# Patient Record
Sex: MALE | Race: WHITE | NOT HISPANIC OR LATINO | Employment: UNEMPLOYED | URBAN - METROPOLITAN AREA
[De-identification: names, ages, dates, MRNs, and addresses within clinical notes are randomized per-mention and may not be internally consistent; named-entity substitution may affect disease eponyms.]

---

## 2019-03-04 ENCOUNTER — OFFICE VISIT (OUTPATIENT)
Dept: FAMILY MEDICINE | Facility: CLINIC | Age: 59
End: 2019-03-04
Payer: COMMERCIAL

## 2019-03-04 ENCOUNTER — HOSPITAL ENCOUNTER (OUTPATIENT)
Dept: GENERAL RADIOLOGY | Facility: CLINIC | Age: 59
Discharge: HOME | End: 2019-03-04
Attending: FAMILY MEDICINE | Admitting: FAMILY MEDICINE
Payer: COMMERCIAL

## 2019-03-04 ENCOUNTER — TELEPHONE (OUTPATIENT)
Dept: FAMILY MEDICINE | Facility: CLINIC | Age: 59
End: 2019-03-04

## 2019-03-04 VITALS
TEMPERATURE: 97.8 F | SYSTOLIC BLOOD PRESSURE: 158 MMHG | HEART RATE: 102 BPM | OXYGEN SATURATION: 96 % | HEIGHT: 70 IN | BODY MASS INDEX: 25.51 KG/M2 | DIASTOLIC BLOOD PRESSURE: 94 MMHG | RESPIRATION RATE: 18 BRPM | WEIGHT: 178.2 LBS

## 2019-03-04 DIAGNOSIS — K42.9 UMBILICAL HERNIA WITHOUT OBSTRUCTION AND WITHOUT GANGRENE: ICD-10-CM

## 2019-03-04 DIAGNOSIS — M54.50 CHRONIC BILATERAL LOW BACK PAIN WITHOUT SCIATICA: ICD-10-CM

## 2019-03-04 DIAGNOSIS — H61.22 IMPACTED CERUMEN OF LEFT EAR: ICD-10-CM

## 2019-03-04 DIAGNOSIS — Z11.59 NEED FOR HEPATITIS C SCREENING TEST: ICD-10-CM

## 2019-03-04 DIAGNOSIS — I10 ESSENTIAL HYPERTENSION: ICD-10-CM

## 2019-03-04 DIAGNOSIS — Z11.3 SCREEN FOR STD (SEXUALLY TRANSMITTED DISEASE): ICD-10-CM

## 2019-03-04 DIAGNOSIS — E55.9 VITAMIN D DEFICIENCY: ICD-10-CM

## 2019-03-04 DIAGNOSIS — Z87.891 PERSONAL HISTORY OF TOBACCO USE: ICD-10-CM

## 2019-03-04 DIAGNOSIS — G89.29 CHRONIC BILATERAL LOW BACK PAIN WITHOUT SCIATICA: ICD-10-CM

## 2019-03-04 DIAGNOSIS — F10.21 ALCOHOL DEPENDENCE IN REMISSION (H): ICD-10-CM

## 2019-03-04 DIAGNOSIS — R97.20 ELEVATED PROSTATE SPECIFIC ANTIGEN (PSA): ICD-10-CM

## 2019-03-04 DIAGNOSIS — Z12.11 COLON CANCER SCREENING: ICD-10-CM

## 2019-03-04 DIAGNOSIS — Z00.00 ENCOUNTER FOR ROUTINE ADULT HEALTH EXAMINATION WITHOUT ABNORMAL FINDINGS: Primary | ICD-10-CM

## 2019-03-04 PROBLEM — Z86.79 HX OF ESSENTIAL HYPERTENSION: Status: ACTIVE | Noted: 2019-03-04

## 2019-03-04 PROBLEM — F10.20 ALCOHOL DEPENDENCE (H): Status: ACTIVE | Noted: 2019-03-04

## 2019-03-04 LAB
ALBUMIN SERPL-MCNC: 4 G/DL (ref 3.4–5)
ALP SERPL-CCNC: 91 U/L (ref 40–150)
ALT SERPL W P-5'-P-CCNC: 19 U/L (ref 0–70)
ANION GAP SERPL CALCULATED.3IONS-SCNC: 4 MMOL/L (ref 3–14)
AST SERPL W P-5'-P-CCNC: 12 U/L (ref 0–45)
BILIRUB SERPL-MCNC: 0.5 MG/DL (ref 0.2–1.3)
BUN SERPL-MCNC: 9 MG/DL (ref 7–30)
CALCIUM SERPL-MCNC: 8.9 MG/DL (ref 8.5–10.1)
CHLORIDE SERPL-SCNC: 110 MMOL/L (ref 94–109)
CHOLEST SERPL-MCNC: 198 MG/DL
CO2 SERPL-SCNC: 28 MMOL/L (ref 20–32)
CREAT SERPL-MCNC: 0.87 MG/DL (ref 0.66–1.25)
DEPRECATED CALCIDIOL+CALCIFEROL SERPL-MC: 10 UG/L (ref 20–75)
GFR SERPL CREATININE-BSD FRML MDRD: >90 ML/MIN/{1.73_M2}
GLUCOSE SERPL-MCNC: 100 MG/DL (ref 70–99)
HCV AB SERPL QL IA: NONREACTIVE
HDLC SERPL-MCNC: 48 MG/DL
HIV 1+2 AB+HIV1 P24 AG SERPL QL IA: NONREACTIVE
LDLC SERPL CALC-MCNC: 123 MG/DL
NONHDLC SERPL-MCNC: 150 MG/DL
POTASSIUM SERPL-SCNC: 4.5 MMOL/L (ref 3.4–5.3)
PROT SERPL-MCNC: 7.7 G/DL (ref 6.8–8.8)
PSA SERPL-ACNC: 4.43 UG/L (ref 0–4)
SODIUM SERPL-SCNC: 142 MMOL/L (ref 133–144)
TRIGL SERPL-MCNC: 134 MG/DL

## 2019-03-04 PROCEDURE — 99386 PREV VISIT NEW AGE 40-64: CPT | Performed by: FAMILY MEDICINE

## 2019-03-04 PROCEDURE — G0103 PSA SCREENING: HCPCS | Performed by: FAMILY MEDICINE

## 2019-03-04 PROCEDURE — 72100 X-RAY EXAM L-S SPINE 2/3 VWS: CPT | Mod: TC

## 2019-03-04 PROCEDURE — 82306 VITAMIN D 25 HYDROXY: CPT | Performed by: FAMILY MEDICINE

## 2019-03-04 PROCEDURE — 99214 OFFICE O/P EST MOD 30 MIN: CPT | Mod: 25 | Performed by: FAMILY MEDICINE

## 2019-03-04 PROCEDURE — 80061 LIPID PANEL: CPT | Performed by: FAMILY MEDICINE

## 2019-03-04 PROCEDURE — 80053 COMPREHEN METABOLIC PANEL: CPT | Performed by: FAMILY MEDICINE

## 2019-03-04 PROCEDURE — 36415 COLL VENOUS BLD VENIPUNCTURE: CPT | Performed by: FAMILY MEDICINE

## 2019-03-04 PROCEDURE — 69210 REMOVE IMPACTED EAR WAX UNI: CPT | Mod: LT | Performed by: FAMILY MEDICINE

## 2019-03-04 PROCEDURE — G0296 VISIT TO DETERM LDCT ELIG: HCPCS | Performed by: FAMILY MEDICINE

## 2019-03-04 PROCEDURE — G0472 HEP C SCREEN HIGH RISK/OTHER: HCPCS | Performed by: FAMILY MEDICINE

## 2019-03-04 PROCEDURE — 82947 ASSAY GLUCOSE BLOOD QUANT: CPT | Performed by: FAMILY MEDICINE

## 2019-03-04 PROCEDURE — 87389 HIV-1 AG W/HIV-1&-2 AB AG IA: CPT | Performed by: FAMILY MEDICINE

## 2019-03-04 RX ORDER — LISINOPRIL 10 MG/1
10 TABLET ORAL DAILY
Qty: 90 TABLET | Refills: 0 | Status: SHIPPED | OUTPATIENT
Start: 2019-03-04 | End: 2019-06-20

## 2019-03-04 ASSESSMENT — PAIN SCALES - GENERAL: PAINLEVEL: SEVERE PAIN (7)

## 2019-03-04 ASSESSMENT — MIFFLIN-ST. JEOR: SCORE: 1619.56
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
## 2019-03-04 NOTE — TELEPHONE ENCOUNTER
----- Message from Blaise Chance Mai, MD sent at 3/4/2019  3:30 PM CST -----  Please let patient know that his prostate enzyme level was slightly elevated, .  Will refer him to the urologist for further evaluation.  His kidney function test, liver function test and electrolytes were normal.  No diabetes.  His cholesterol looks good.  Recommend to work on healthy diet and exercise as discussed in the office today.  Thank you.

## 2019-03-04 NOTE — PATIENT INSTRUCTIONS
Preventive Health Recommendations  Male Ages 50 - 64    Yearly exam:             See your health care provider every year in order to  o   Review health changes.   o   Discuss preventive care.    o   Review your medicines if your doctor has prescribed any.     Have a cholesterol test every 5 years, or more frequently if you are at risk for high cholesterol/heart disease.     Have a diabetes test (fasting glucose) every three years. If you are at risk for diabetes, you should have this test more often.     Have a colonoscopy at age 50, or have a yearly FIT test (stool test). These exams will check for colon cancer.      Talk with your health care provider about whether or not a prostate cancer screening test (PSA) is right for you.    You should be tested each year for STDs (sexually transmitted diseases), if you re at risk.     Shots: Get a flu shot each year. Get a tetanus shot every 10 years.     Nutrition:    Eat at least 5 servings of fruits and vegetables daily.     Eat whole-grain bread, whole-wheat pasta and brown rice instead of white grains and rice.     Get adequate Calcium and Vitamin D.     Lifestyle    Exercise for at least 150 minutes a week (30 minutes a day, 5 days a week). This will help you control your weight and prevent disease.     Limit alcohol to one drink per day.     No smoking.     Wear sunscreen to prevent skin cancer.     See your dentist every six months for an exam and cleaning.     See your eye doctor every 1 to 2 years.    Lung Cancer Screening   Frequently Asked Questions  If you are at high-risk for lung cancer, getting screened with low-dose computed tomography (LDCT) every year can help save your life. This handout offers answers to some of the most common questions about lung cancer screening. If you have other questions, please call [phone] ([phone]).     What is it?  Lung cancer screening uses special X-ray technology to create an image of your lung tissue.  The exam is quick and easy and takes less than 10 seconds. We don t give you any medicine or use any needles. You can eat before and after the exam. You don t need to change your clothes as long as the clothing on your chest doesn t contain metal. But, you do need to be able to hold your breath for at least 6 seconds during the exam.    What is the goal of lung cancer screening?  The goal of lung cancer screening is to save lives. Many times, lung cancer is not found until a person starts having physical symptoms. Lung cancer screening can help detect lung cancer in the earliest stages when it may be easier to treat.    Who should be screened for lung cancer?  We suggest lung cancer screening for anyone who is at high-risk for lung cancer. You are in the high-risk group if you:      are between the ages of 55 and 79, and    have smoked at least 1 pack of cigarettes a day for 30 or more years, and    still smoke or have quit within the past 15 years.    However, if you have a new cough or shortness of breath, you should talk to your doctor before being screened.    Some national lung health advocacy groups also recommend screening for people ages 50 to 79 who have smoked an average of 1 pack of cigarettes a day for 20 years. They must also have at least 1 other risk factor for lung cancer, not including exposure to secondhand smoke. Other risk factors are having had cancer in the past, emphysema, pulmonary fibrosis, COPD, a family history of lung cancer, or exposure to certain materials such as arsenic, asbestos, beryllium, cadmium, chromium, diesel fumes, nickel, radon or silica. Your care team can help you know if you have one of these risk factors.     Why does it matter if I have symptoms?  Certain symptoms can be a sign that you have a condition in your lungs that should be checked and treated by your doctor. These symptoms include fever, chest pain, a new or changing cough, shortness of breath that you have  never felt before, coughing up blood or unexplained weight loss. Having any of these symptoms can greatly affect the results of lung cancer screening.       Should all smokers get an LDCT lung cancer screening exam?  It depends. Lung cancer screening is for a very specific group of men and women who have a history of heavy smoking over a long period of time (see  Who should be screened for lung cancer  above).  I am in the high-risk group, but have been diagnosed with cancer in the past. Is LDCT lung cancer screening right for me?  In some cases, you should not have LDCT lung screening, such as when your doctor is already following your cancer with CT scan studies. Your doctor will help you decide if LDCT lung screening is right for you.  Do I need to have a screening exam every year?  Yes. If you are in the high-risk group described earlier, you should get an LDCT lung cancer screening exam every year until you are 79, or are no longer willing or able to undergo screening and possible procedures to diagnose and treat lung cancer.  How effective is LDCT at preventing death from lung cancer?  Studies have shown that LDCT lung cancer screening can lower the risk of death from lung cancer by 20 percent in people who are at high-risk.  What are the risks?  There are some risks and limitations of LDCT lung cancer screening. We want to make sure you understand the risks and benefits, so please let us know if you have any questions. Your doctor may want to talk with you more about these risks.    Radiation exposure: As with any exam that uses radiation, there is a very small increased risk of cancer. The amount of radiation in LDCT is small--about the same amount a person would get from a mammogram. Your doctor orders the exam when he or she feels the potential benefits outweigh the risks.    False negatives: No test is perfect, including LDCT. It is possible that you may have a medical condition, including lung cancer,  that is not found during your exam. This is called a false negative result.    False positives and more testing: LDCT very often finds something in the lung that could be cancer, but in fact is not. This is called a false positive result. False positive tests often cause anxiety. To make sure these findings are not cancer, you may need to have more tests. These tests will be done only if you give us permission. Sometimes patients need a treatment that can have side effects, such as a biopsy. For more information on false positives, see  What can I expect from the results?     Findings not related to lung cancer: Your LDCT exam also takes pictures of areas of your body next to your lungs. In a very small number of cases, the CT scan will show an abnormal finding in one of these areas, such as your kidneys, adrenal glands, liver or thyroid. This finding may not be serious, but you may need more tests. Your doctor can help you decide what other tests you may need, if any.  What can I expect from the results?  About 1 out of 4 LDCT exams will find something that may need more tests. Most of the time, these findings are lung nodules. Lung nodules are very small collections of tissue in the lung. These nodules are very common, and the vast majority--more than 97 percent--are not cancer (benign). Most are normal lymph nodes or small areas of scarring from past infections.  But, if a small lung nodule is found to be cancer, the cancer can be cured more than 90 percent of the time. To know if the nodule is cancer, we may need to get more images before your next yearly screening exam. If the nodule has suspicious features (for example, it is large, has an odd shape or grows over time), we will refer you to a specialist for further testing.  Will my doctor also get the results?  Yes. Your doctor will get a copy of your results.  Is it okay to keep smoking now that there s a cancer screening exam?  No. Tobacco is one of the  strongest cancer-causing agents. It causes not only lung cancer, but other cancers and cardiovascular (heart) diseases as well. The damage caused by smoking builds over time. This means that the longer you smoke, the higher your risk of disease. While it is never too late to quit, the sooner you quit, the better.  Where can I find help to quit smoking?  The best way to prevent lung cancer is to stop smoking. If you have already quit smoking, congratulations and keep it up! For help on quitting smoking, please call [place] at 0-520-988-KGYB ([phone]) or the American [employer] Society at 1-948.511.5151 to find local resources near you.  One-on-one health coaching:  If you d prefer to work individually with a health care provider on tobacco cessation, we offer:      Medication Therapy Management:  Our specially trained pharmacists work closely with you and your doctor to help you quit smoking.  Call [phone] or [phone] (toll free).     Can Do: Health coaching offered by [facility].  [email or web address]

## 2019-03-04 NOTE — TELEPHONE ENCOUNTER
Left message to call back. Please relay the results to pt when calls back and route to Team if pt is in agreement with consult.

## 2019-03-05 DIAGNOSIS — E55.9 VITAMIN D DEFICIENCY: Primary | ICD-10-CM

## 2019-03-05 RX ORDER — ERGOCALCIFEROL 1.25 MG/1
50000 CAPSULE, LIQUID FILLED ORAL WEEKLY
Qty: 12 CAPSULE | Refills: 0 | Status: SHIPPED | OUTPATIENT
Start: 2019-03-05 | End: 2019-05-22

## 2019-03-05 NOTE — TELEPHONE ENCOUNTER
Referral for urology placed they will contact the patient with a date and time.   Tatyana RAMIREZ

## 2019-03-06 ENCOUNTER — TELEPHONE (OUTPATIENT)
Dept: FAMILY MEDICINE | Facility: CLINIC | Age: 59
End: 2019-03-06

## 2019-03-06 DIAGNOSIS — M54.9 BACK PAIN: Primary | ICD-10-CM

## 2019-03-06 NOTE — TELEPHONE ENCOUNTER
----- Message from Blaise Chance Mai, MD sent at 3/5/2019  5:43 PM CST -----  Please let patient know that his labs showed his vitamin D level was low and will call in the prescription for it.  Please take it as prescribed.  His screening test for hepatitis C and HIV were negative.

## 2019-03-06 NOTE — TELEPHONE ENCOUNTER
----- Message from Blaise Chance Mai, MD sent at 3/6/2019 11:27 AM CST -----  Please let patient know that his x-rays of the spine showed arthritis at multiple levels.  Recommend to consider physical therapy if the pain is bothersome.  Follow-up if he has any concerns or question.

## 2019-03-07 PROBLEM — E55.9 VITAMIN D DEFICIENCY: Status: ACTIVE | Noted: 2019-03-07

## 2019-03-07 PROBLEM — K42.9 UMBILICAL HERNIA WITHOUT OBSTRUCTION AND WITHOUT GANGRENE: Status: ACTIVE | Noted: 2019-03-07

## 2019-03-07 PROBLEM — R97.20 ELEVATED PROSTATE SPECIFIC ANTIGEN (PSA): Status: ACTIVE | Noted: 2019-03-07

## 2019-03-21 ENCOUNTER — TELEPHONE (OUTPATIENT)
Dept: FAMILY MEDICINE | Facility: CLINIC | Age: 59
End: 2019-03-21

## 2019-03-21 NOTE — TELEPHONE ENCOUNTER
Reason for Call:  Form, our goal is to have forms completed with 72 hours, however, some forms may require a visit or additional information.    Type of letter, form or note:  Request for medical opinion    Who is the form from?: Patient    Where did the form come from: Patient or family brought in       What clinic location was the form placed at?: [facility]    Where the form was placed: 's Box    What number is listed as a contact on the form?: number to contact is his nephew Maik [phone]       Additional comments: please fax form when it is complete     Call taken on 3/21/2019 at 12:01 PM by Tatyana Gould

## 2019-03-23 NOTE — TELEPHONE ENCOUNTER
I saw him once.  Not sure why he can't work.  Not able to give a fair and accurate workability opinion.  Follow up to further evaluate if he feels he is not able to work

## 2019-03-25 NOTE — TELEPHONE ENCOUNTER
Left message with male that answered the number given. He states he will let the patient know to call the clinic at [phone] to advise on message below.  Kasandra Berg, CMA

## 2019-03-26 NOTE — TELEPHONE ENCOUNTER
Patient has an appt with antoinette 4/5 and he will address this then.  Katharine Cosby MA 3/26/2019

## 2019-04-16 ENCOUNTER — HOSPITAL ENCOUNTER (OUTPATIENT)
Dept: GENERAL RADIOLOGY | Facility: CLINIC | Age: 59
Discharge: HOME | End: 2019-04-16
Attending: FAMILY MEDICINE | Admitting: FAMILY MEDICINE
Payer: COMMERCIAL

## 2019-04-16 ENCOUNTER — OFFICE VISIT (OUTPATIENT)
Dept: FAMILY MEDICINE | Facility: CLINIC | Age: 59
End: 2019-04-16
Payer: COMMERCIAL

## 2019-04-16 VITALS
TEMPERATURE: 97.8 F | WEIGHT: 177 LBS | HEART RATE: 100 BPM | SYSTOLIC BLOOD PRESSURE: 136 MMHG | DIASTOLIC BLOOD PRESSURE: 82 MMHG | OXYGEN SATURATION: 96 % | RESPIRATION RATE: 14 BRPM | BODY MASS INDEX: 25.4 KG/M2

## 2019-04-16 DIAGNOSIS — R97.20 ELEVATED PROSTATE SPECIFIC ANTIGEN (PSA): ICD-10-CM

## 2019-04-16 DIAGNOSIS — M54.10 BACK PAIN WITH RIGHT-SIDED RADICULOPATHY: ICD-10-CM

## 2019-04-16 DIAGNOSIS — Z12.11 COLON CANCER SCREENING: ICD-10-CM

## 2019-04-16 DIAGNOSIS — M25.551 HIP PAIN, RIGHT: ICD-10-CM

## 2019-04-16 DIAGNOSIS — I10 ESSENTIAL HYPERTENSION: Primary | ICD-10-CM

## 2019-04-16 PROCEDURE — 73502 X-RAY EXAM HIP UNI 2-3 VIEWS: CPT | Mod: TC

## 2019-04-16 PROCEDURE — 99214 OFFICE O/P EST MOD 30 MIN: CPT | Performed by: FAMILY MEDICINE

## 2019-04-16 RX ORDER — DICLOFENAC POTASSIUM 50 MG/1
50 TABLET, FILM COATED ORAL 3 TIMES DAILY PRN
Qty: 90 TABLET | Refills: 0 | Status: SHIPPED | OUTPATIENT
Start: 2019-04-16 | End: 2021-01-20

## 2019-04-16 ASSESSMENT — PAIN SCALES - GENERAL: PAINLEVEL: EXTREME PAIN (9)

## 2019-04-16 NOTE — PROGRESS NOTES
SUBJECTIVE:   Iftikhar DOVE is a 59 year old male who presents to clinic today for the following   health issues:    Hypertension Follow-up      Outpatient blood pressures are not being checked.    Low Salt Diet: no added salt      Amount of exercise or physical activity: 4-5 days/week for an average of 30-45 minutes    Problems taking medications regularly: No    Medication side effects: none    Diet: regular (no restrictions)    Iftikhar is here today for couple concerns.  First is to follow-up on the high blood pressure.  He has it for several years and has been controlled with lisinopril 10 mg daily.  No side effect.  Been taking medication as prescribed.  Not checking his blood pressure at home.  No headache or dizziness.  No chest pain or shortness of breath.  No leg swelling, orthopnea or dyspnea.  His blood pressure last month was normal.    Second concern is the back pain.  He has had for years and it has gotten worse slowly.  Tried for physical therapy in the past and with no effect.  In the lower back that radiates to the right leg.  It is a constant achy pain - becomes sharp with certain activities.  Also has a right hip pain.  No unusual activities or trauma.  Had back injury many years ago.  No fever or chills.  No problem with control her bowel movement or urination.  Is on his feet a lot and his work involves heavy lifting as he works as a .  Never had injection in the back before.  He was seen for this about a month ago and x-rays show arthritis.  He was recommended physical therapy has not started yet.    He was also found to have elevated PSA level recently.  No personal or family history of prostate cancer.  No nocturia but does have urine frequency at times.  No unintended weight loss.        PROBLEMS TO ADD ON...  FORMS questions    Additional history: as documented    Reviewed  and updated as needed this visit by clinical staff  Tobacco  Allergies  Meds  Soc Hx         Reviewed and updated as needed this visit by Provider         Current Outpatient Medications   Medication Sig Dispense Refill     diclofenac (CATAFLAM) 50 MG tablet Take 1 tablet (50 mg) by mouth 3 times daily as needed 90 tablet 0     lisinopril (PRINIVIL/ZESTRIL) 10 MG tablet Take 1 tablet (10 mg) by mouth daily 90 tablet 0     vitamin D2 (ERGOCALCIFEROL) 36447 units (1250 mcg) capsule Take 1 capsule (50,000 Units) by mouth once a week for 12 doses 12 capsule 0     Allergies   Allergen Reactions     Penicillins Unknown       ROS:  Constitutional, HEENT, cardiovascular, pulmonary, gi and gu systems are negative, except as otherwise noted.    OBJECTIVE:     /82 (BP Location: Right arm, Patient Position: Sitting, Cuff Size: Adult Large)   Pulse 100   Temp 97.8  F (36.6  C) (Temporal)   Resp 14   Wt 80.3 kg (177 lb)   SpO2 96%   BMI 25.40 kg/m    Body mass index is 25.4 kg/m .  GENERAL: healthy, alert and no distress  RESP: lungs clear to auscultation - no rales, rhonchi or wheezes  CV: regular rate and rhythm, no murmur  MS: no gross musculoskeletal defects noted, no edema.  Walk without limping.  Normal gait.  Both legs are equally in strength.  Ankle and knee exam were normal.  Right hip was slightly tender with palpation.  His range of motion was normal.  Pain with internal and external rotation the hip as well.  Back is straight, no lordosis or scoliosis.  Tender with palpation to the lower spine and its paraspinous muscle diffusely.  NEURO: Normal strength and tone, cranial nerves II through XII intact.  DTRs +2 throughout.    Diagnostic Test Results:  Results for orders placed or performed during the hospital encounter of 03/04/19   XR Lumbar Spine 2/3 Views    Narrative    LUMBAR SPINE TWO TO THREE VIEWS   3/4/2019 9:16 AM     HISTORY: Chronic back pain. Chronic bilateral low back pain without  sciatica.     COMPARISON: None.    FINDINGS: Alignment is significant for leftward deviation of the  lower  lumbar spine with respect to the thoracolumbar junction. Multilevel  mild disc height loss is present throughout. Moderate disc height loss  is present at L4-5 and L5-S1. Large osteophytes are present projecting  anteriorly on the right most conspicuous at L1-2, L2-3, and L3-4.  Multilevel facet arthropathy is present most severe at L4-5 and L5-S1.  No fractures are identified. Vertebral body heights are maintained.  Slight wedging of the L1, L2, and L3 vertebral bodies is likely  physiologic/degenerative. Paraspinal soft tissues are unremarkable.      Impression    IMPRESSION: Moderate degenerative change.    DENVER FIGUEROA MD       ASSESSMENT/PLAN:     1. Essential hypertension  Blood pressure is normal and stable.  Continue with the lisinopril.  Follow-up in 6 months.    2. Elevated prostate specific antigen (PSA)  PSA is slightly elevated.  Discussed with him about the nature of the finding.  Refer to urology for further evaluation.    3. Back pain with right-sided radiculopathy  Has had the pain for 20 years and is getting worse.  He works involved heavy lifting and prolonged standing through out the day as a .  X-ray a month ago showed arthritis.  Physical exam today was negative for focal neurological deficit.  Discussed about physical therapy versus MRI.  It is affecting his daily activities and his work.  Will refer to physical therapy.  Also will send for spine MRI.  In the meantime, will continue with his normal activities as tolerated.  Tylenol as needed for pain.  Also will have him try the diclofenac.    - diclofenac (CATAFLAM) 50 MG tablet; Take 1 tablet (50 mg) by mouth 3 times daily as needed  Dispense: 90 tablet; Refill: 0    4. Hip pain, right  X-rays show arthritis.  This certainly could be due to radiculopathy pain.  MRI of the spine as mentioned above.  Tylenol or diclofenac as needed for pain.  Also sent for physical therapy.    - diclofenac (CATAFLAM) 50 MG tablet;  Take 1 tablet (50 mg) by mouth 3 times daily as needed  Dispense: 90 tablet; Refill: 0    5. Colon cancer screening    - Fecal colorectal cancer screen (FIT); Future    Blaise Chance Mai, MD  [facility]

## 2019-04-16 NOTE — Clinical Note
1.  Please refer him to urology for elevated PSA2.  Please set up the lumbar spine MRI for low back pain - ordered3.  Please refer to physical therapy for low back pain and hip pain. - ordered

## 2019-04-18 ENCOUNTER — TELEPHONE (OUTPATIENT)
Dept: FAMILY MEDICINE | Facility: CLINIC | Age: 59
End: 2019-04-18

## 2019-04-18 NOTE — TELEPHONE ENCOUNTER
----- Message from Blaise Chance Mai, MD sent at 4/17/2019  6:09 PM CDT -----  Please let patient know that his hip x-ray showed mild arthritis.  If interested, I can refer him to orthopedic for further evaluation.  The other option is start physical therapy as discussed

## 2019-04-18 NOTE — LETTER
[address] [address]  [phone]        April 26, 2019    Iftikhar DOVE  [address]  [facility] [facility] [place] [address]          Dear Iftikhar,    Your hip x-ray showed mild arthritis.  If interested, Dr. Cox can refer you to an orthopedic for further evaluation.  The other option is to start physical therapy as discussed.  Orders have been placed for urology, PT, and MRI.  You can call radiology to make an MRI appointment and also for physical therapy appointment.  Please let us know if you have any further questions.    Sincerely,        Blaise Cox M.D. , jrm, cma

## 2019-04-19 NOTE — TELEPHONE ENCOUNTER
Tried to reach patient, left message for patient to call the clinic back.    Iva Roland, [facility]

## 2019-04-22 ENCOUNTER — TELEPHONE (OUTPATIENT)
Dept: FAMILY MEDICINE | Facility: CLINIC | Age: 59
End: 2019-04-22

## 2019-04-22 DIAGNOSIS — R97.20 ELEVATED PROSTATE SPECIFIC ANTIGEN (PSA): Primary | ICD-10-CM

## 2019-04-22 NOTE — TELEPHONE ENCOUNTER
When Ed calls back please let him know that Orders for Urology, PT and MRI have been placed.     Urology will be calling him to set up appointment.  Ed can be transferred to Radiology to make MRI appointment and also Physical Therapy.

## 2019-04-22 NOTE — TELEPHONE ENCOUNTER
Urology, MRI and PT are ordered.    Please let Ed know that Urology will be calling him to set up an appointment and he can be transferred to Radiology and/or PT to make the other appointments.    Please see other phone encounter as well.

## 2019-04-22 NOTE — TELEPHONE ENCOUNTER
Blaise Cox MD  [facility]             1.  Please refer him to urology for elevated PSA     2.  Please set up the lumbar spine MRI for low back pain - ordered     3.  Please refer to physical therapy for low back pain and hip pain. - ordered

## 2019-04-22 NOTE — TELEPHONE ENCOUNTER
Tried to reach patient, left message for patient to call the clinic back.    Iva Roland, [facility]

## 2019-04-23 NOTE — TELEPHONE ENCOUNTER
Tried to reach patient, left message for patient to call the clinic back.     Iva Roland, [facility]

## 2019-04-24 NOTE — TELEPHONE ENCOUNTER
Left message on mobile number asking for pt to call us back, it was some one else's name on the answering machine.  Tried home phone and who answered phone said there is no Edward there, that we have the wrong number.  Anthony Horton, [facility]

## 2019-04-26 NOTE — TELEPHONE ENCOUNTER
Mailed letter.  If patient calls back please inform him of the messages below.  Anthony Horton, CMA

## 2019-06-20 DIAGNOSIS — I10 ESSENTIAL HYPERTENSION: ICD-10-CM

## 2019-06-24 RX ORDER — LISINOPRIL 10 MG/1
TABLET ORAL
Qty: 90 TABLET | Refills: 2 | Status: SHIPPED | OUTPATIENT
Start: 2019-06-24 | End: 2021-02-24

## 2019-06-24 NOTE — TELEPHONE ENCOUNTER
"Requested Prescriptions   Pending Prescriptions Disp Refills     lisinopril (PRINIVIL/ZESTRIL) 10 MG tablet [Pharmacy Med Name: LISINOPRIL 10MG TABS] 90 tablet 0     Sig: TAKE ONE TABLET BY MOUTH ONCE DAILY   Last Written Prescription Date:  3/4/2019  Last Fill Quantity: 90,  # refills: 0   Last office visit: 4/16/2019 with prescribing provider:  Brooke   Future Office Visit:      ACE Inhibitors (Including Combos) Protocol Passed - 6/20/2019 12:22 PM        Passed - Blood pressure under 140/90 in past 12 months     BP Readings from Last 3 Encounters:   04/16/19 136/82   03/04/19 (!) 158/94   07/24/14 129/81           Passed - Recent (12 mo) or future (30 days) visit within the authorizing provider's specialty     Patient had office visit in the last 12 months or has a visit in the next 30 days with authorizing provider or within the authorizing provider's specialty.  See \"Patient Info\" tab in inbasket, or \"Choose Columns\" in Meds & Orders section of the refill encounter.            Passed - Medication is active on med list        Passed - Patient is age 18 or older        Passed - Normal serum creatinine on file in past 12 months     Recent Labs   Lab Test 03/04/19  0921   CR 0.87           Passed - Normal serum potassium on file in past 12 months     Recent Labs   Lab Test 03/04/19  0921   POTASSIUM 4.5           Prescription approved per [facility] Refill Protocol.  Thelma Portillo RN    "

## 2019-06-27 ENCOUNTER — TELEPHONE (OUTPATIENT)
Dept: FAMILY MEDICINE | Facility: CLINIC | Age: 59
End: 2019-06-27

## 2019-06-27 DIAGNOSIS — M25.551 HIP PAIN, RIGHT: Primary | ICD-10-CM

## 2019-06-27 NOTE — TELEPHONE ENCOUNTER
Patient tried to get the mri scheduled but they are . Can we reorder those? Patient did fall yesterday as his knees are now giving out. So they would like to get patient scheduled for the MRI.     Katharine Cosby MA 2019

## 2019-06-27 NOTE — TELEPHONE ENCOUNTER
Reason for Call: Request for an order or referral:    Order or referral being requested: Xray     Date needed: at your convenience    Has the patient been seen by the PCP for this problem?No But had seen Dr Cox     Additional comments: Patients niece calling stating she had tried to schedule from an order that was placed but radiology said it was . States patient is having issues with Knee and back but that is not what the order was for. Niece seems unsure of what was ordered but wants it scheduled as patient had fallen yesterday. Josias is [facility] authorized for scheduling and picking up items.   Please call and advise.     Phone number Patient can be reached at:  Other phone number:  [phone]    Best Time:       Can we leave a detailed message on this number?  Not Applicable    Call taken on 2019 at 12:00 PM by Gaby Gould

## 2019-06-28 NOTE — TELEPHONE ENCOUNTER
Spoke with Niece Josias, we are not erendira gto to the MRI of the spine. She is going to schedule physical therapy for his back and right hip and will also schedule with Orthopedics for right hip.  She scheduled him with Dr. Cox to discuss his knee issues.   Tatyana RAMIREZ

## 2019-07-08 NOTE — PROGRESS NOTES
Sports Medicine Clinic Visit    PCP: No Ref-Primary, Physician    CC: Patient presents with:  Right Hip - Pain      HPI:  Iftikhar DOVE is a 59 year old male who is seen in consultation at the request of Dr. Cox.   He notes right hip pain that has worsened over the last 2 years ago when he with no new injury.  States that he had a fall down the stairs in the 90's which started the pain.   He rates the pain at a  10/10 at its worst and a 9/10 currently.  Symptoms are relieved with nothing. Symptoms are worsened by cervical extension, prolonged standing, walking more than 1 mile. He endorses weakness and pain in other joints.   He denies numbness and tingling.  Other treatment has included physical therapy.  He notes difficulty with twisting.        Review of Systems:  Musculoskeletal: as above  Remainder of review of systems is negative including constitutional, eyes, ENT, CV, pulmonary, GI, , endocrine, skin, hematologic, and neurologic except as noted in HPI or medical history.    History reviewed. No pertinent past surgical/medical/family/social history other than as mentioned in HPI.    Patient Active Problem List   Diagnosis     Alcohol dependence (H)     Chronic bilateral low back pain without sciatica     Essential hypertension     Elevated prostate specific antigen (PSA)     Vitamin D deficiency     Umbilical hernia without obstruction and without gangrene     Past Medical History:   Diagnosis Date     Hypertension      Past Surgical History:   Procedure Laterality Date     NO HISTORY OF SURGERY       Family History   Problem Relation Age of Onset     Cancer Mother         colon -  75     Cancer Father         brain tumor     Unknown/Adopted Maternal Grandmother      Unknown/Adopted Maternal Grandfather      Unknown/Adopted Paternal Grandmother      Unknown/Adopted Paternal Grandfather      Unknown/Adopted Sister      No Known Problems Son      Unknown/Adopted Sister      Unknown/Adopted Sister       Unknown/Adopted Sister      No Known Problems Son      Social History     Socioeconomic History     Marital status: Single     Spouse name: Not on file     Number of children: Not on file     Years of education: Not on file     Highest education level: Not on file   Occupational History     Not on file   Social Needs     Financial resource strain: Not on file     Food insecurity:     Worry: Not on file     Inability: Not on file     Transportation needs:     Medical: Not on file     Non-medical: Not on file   Tobacco Use     Smoking status: Current Every Day Smoker     Packs/day: 1.00     Years: 50.00     Pack years: 50.00     Smokeless tobacco: Never Used   Substance and Sexual Activity     Alcohol use: Not Currently     Alcohol/week: 21.0 oz     Types: 42 Cans of beer per week     Comment: used to drink heavily - stopped since 1/19/2019     Drug use: Yes     Types: Marijuana     Comment: daily use     Sexual activity: Never     Partners: Female   Lifestyle     Physical activity:     Days per week: Not on file     Minutes per session: Not on file     Stress: Not on file   Relationships     Social connections:     Talks on phone: Not on file     Gets together: Not on file     Attends Bahai service: Not on file     Active member of club or organization: Not on file     Attends meetings of clubs or organizations: Not on file     Relationship status: Not on file     Intimate partner violence:     Fear of current or ex partner: Not on file     Emotionally abused: Not on file     Physically abused: Not on file     Forced sexual activity: Not on file   Other Topics Concern     Not on file   Social History Narrative     Not on file         Current Outpatient Medications   Medication     diclofenac (CATAFLAM) 50 MG tablet     lisinopril (PRINIVIL/ZESTRIL) 10 MG tablet     No current facility-administered medications for this visit.      Allergies   Allergen Reactions     Penicillins Unknown         Objective:  BP  (!) 160/97   Wt 77.6 kg (171 lb)   BMI 24.54 kg/m      General: Alert and in no distress    Head: Normocephalic, atraumatic  Eyes: no scleral icterus or conjunctival erythema   Oropharynx:  Mucous membranes moist  Skin: no erythema, petechiae, or jaundice  CV: regular rhythm by palpation, 2+ distal pulses  Resp: normal respiratory effort without conversational dyspnea   Psych: normal mood and affect    Gait: Non-antalgic, appropriate coordination and balance   Neuro: Motor strength and sensation as noted below    Musculoskeletal:    Hip and Low back exam:    Inspection:     no visible deformity in the low back       normal skin       normal vascular       normal lymphatic       no asymmetry    Palpation:  Tender over the lumbar spine    Lumbar ROM: Forward flexion is decreased and painful.  Lateral flexion is decreased.      Hip ROM:  Bilateral hip ROM is decreased, left more so than right.  Right low back pain with right hip ROM.      Strength:  5/5 hip flexion/abduction/adduction, knee flexion/extension, ankle dorsiflexion/plantarflexion, great toe extension, toe flexion    Sensation:    grossly intact throughout lower extremities    Radiology:  Independent visualization of images performed and reviewed with Ed.    PELVIS AND HIP RIGHT ONE VIEW   4/16/2019 2:50 PM      HISTORY: Hip pain, right.     COMPARISON: None.     FINDINGS:  Subtle lucencies in the right greater trochanter on the  frog-leg lateral view likely represent artifact. Subtle fractures in  this region are considered less likely. There are small osteophytes  around the right femoral head and right acetabulum as well as the left  femoral head and left acetabulum most consistent with mild  degenerative changes. Joint spaces are grossly well-maintained in the  bilateral hips. SI joints are unremarkable. No evidence for fracture  or malalignment. Overlying soft tissues are unremarkable.                                                                       IMPRESSION:  1. Mild degenerative changes of the bilateral hips.  2. Subtle lucency longitudinally projected over the greater trochanter  on the frog-leg lateral view likely represents artifact due to adipose  tissue in the leg. A very subtle fracture is considered less likely.  No other evidence for fracture.  3. No other abnormalities are identified.     HANSA ROYAL MD    LUMBAR SPINE TWO TO THREE VIEWS   3/4/2019 9:16 AM      HISTORY: Chronic back pain. Chronic bilateral low back pain without  sciatica.      COMPARISON: None.     FINDINGS: Alignment is significant for leftward deviation of the lower  lumbar spine with respect to the thoracolumbar junction. Multilevel  mild disc height loss is present throughout. Moderate disc height loss  is present at L4-5 and L5-S1. Large osteophytes are present projecting  anteriorly on the right most conspicuous at L1-2, L2-3, and L3-4.  Multilevel facet arthropathy is present most severe at L4-5 and L5-S1.  No fractures are identified. Vertebral body heights are maintained.  Slight wedging of the L1, L2, and L3 vertebral bodies is likely  physiologic/degenerative. Paraspinal soft tissues are unremarkable.                                                                      IMPRESSION: Moderate degenerative change.     DENVER FIGUEROA MD      Assessment:  1. Chronic bilateral low back pain without sciatica        Plan:  Discussed the assessment with the patient and developed a plan together:  -MRI of the lumbar spine ordered.  [facility] [phone]. Cost estimates can be provided by [facility] at [phone].    -Edward to follow up with primary care provider regarding elevated blood pressure.    -Following completion of MRI in clinic. Please schedule at least 1-2 business days after MRI is completed to ensure we have the results of the MRI.      Dalia Duran MD, [facility] Sports Medicine  [facility]

## 2019-07-09 ENCOUNTER — OFFICE VISIT (OUTPATIENT)
Dept: ORTHOPEDICS | Facility: CLINIC | Age: 59
End: 2019-07-09
Payer: COMMERCIAL

## 2019-07-09 VITALS — WEIGHT: 171 LBS | SYSTOLIC BLOOD PRESSURE: 160 MMHG | DIASTOLIC BLOOD PRESSURE: 97 MMHG | BODY MASS INDEX: 24.54 KG/M2

## 2019-07-09 DIAGNOSIS — M54.50 CHRONIC BILATERAL LOW BACK PAIN WITHOUT SCIATICA: Primary | ICD-10-CM

## 2019-07-09 DIAGNOSIS — G89.29 CHRONIC BILATERAL LOW BACK PAIN WITHOUT SCIATICA: Primary | ICD-10-CM

## 2019-07-09 PROCEDURE — 99244 OFF/OP CNSLTJ NEW/EST MOD 40: CPT | Performed by: PHYSICAL MEDICINE & REHABILITATION

## 2019-07-09 NOTE — LETTER
2019         RE: Iftikhar DOVE  [address]  [facility] [address]        Dear Colleague,    Thank you for referring your patient, Iftikhar DOVE, to the [facility]. Please see a copy of my visit note below.    Sports Medicine Clinic Visit    PCP: No Ref-Primary, Physician    CC: Patient presents with:  Right Hip - Pain      HPI:  Iftikhar DOVE is a 59 year old male who is seen in consultation at the request of Dr. Cox.   He notes right hip pain that has worsened over the last 2 years ago when he with no new injury.  States that he had a fall down the stairs in the 90's which started the pain.   He rates the pain at a  10/10 at its worst and a 9/10 currently.  Symptoms are relieved with nothing. Symptoms are worsened by cervical extension, prolonged standing, walking more than 1 mile. He endorses weakness and pain in other joints.   He denies numbness and tingling.  Other treatment has included physical therapy.  He notes difficulty with twisting.        Review of Systems:  Musculoskeletal: as above  Remainder of review of systems is negative including constitutional, eyes, ENT, CV, pulmonary, GI, , endocrine, skin, hematologic, and neurologic except as noted in HPI or medical history.    History reviewed. No pertinent past surgical/medical/family/social history other than as mentioned in HPI.    Patient Active Problem List   Diagnosis     Alcohol dependence (H)     Chronic bilateral low back pain without sciatica     Essential hypertension     Elevated prostate specific antigen (PSA)     Vitamin D deficiency     Umbilical hernia without obstruction and without gangrene     Past Medical History:   Diagnosis Date     Hypertension      Past Surgical History:   Procedure Laterality Date     NO HISTORY OF SURGERY       Family History   Problem Relation Age of Onset     Cancer Mother         colon -  75     Cancer Father         brain tumor     Unknown/Adopted Maternal  Grandmother      Unknown/Adopted Maternal Grandfather      Unknown/Adopted Paternal Grandmother      Unknown/Adopted Paternal Grandfather      Unknown/Adopted Sister      No Known Problems Son      Unknown/Adopted Sister      Unknown/Adopted Sister      Unknown/Adopted Sister      No Known Problems Son      Social History     Socioeconomic History     Marital status: Single     Spouse name: Not on file     Number of children: Not on file     Years of education: Not on file     Highest education level: Not on file   Occupational History     Not on file   Social Needs     Financial resource strain: Not on file     Food insecurity:     Worry: Not on file     Inability: Not on file     Transportation needs:     Medical: Not on file     Non-medical: Not on file   Tobacco Use     Smoking status: Current Every Day Smoker     Packs/day: 1.00     Years: 50.00     Pack years: 50.00     Smokeless tobacco: Never Used   Substance and Sexual Activity     Alcohol use: Not Currently     Alcohol/week: 21.0 oz     Types: 42 Cans of beer per week     Comment: used to drink heavily - stopped since 1/19/2019     Drug use: Yes     Types: Marijuana     Comment: daily use     Sexual activity: Never     Partners: Female   Lifestyle     Physical activity:     Days per week: Not on file     Minutes per session: Not on file     Stress: Not on file   Relationships     Social connections:     Talks on phone: Not on file     Gets together: Not on file     Attends Confucianist service: Not on file     Active member of club or organization: Not on file     Attends meetings of clubs or organizations: Not on file     Relationship status: Not on file     Intimate partner violence:     Fear of current or ex partner: Not on file     Emotionally abused: Not on file     Physically abused: Not on file     Forced sexual activity: Not on file   Other Topics Concern     Not on file   Social History Narrative     Not on file         Current Outpatient Medications    Medication     diclofenac (CATAFLAM) 50 MG tablet     lisinopril (PRINIVIL/ZESTRIL) 10 MG tablet     No current facility-administered medications for this visit.      Allergies   Allergen Reactions     Penicillins Unknown         Objective:  BP (!) 160/97   Wt 77.6 kg (171 lb)   BMI 24.54 kg/m       General: Alert and in no distress    Head: Normocephalic, atraumatic  Eyes: no scleral icterus or conjunctival erythema   Oropharynx:  Mucous membranes moist  Skin: no erythema, petechiae, or jaundice  CV: regular rhythm by palpation, 2+ distal pulses  Resp: normal respiratory effort without conversational dyspnea   Psych: normal mood and affect    Gait: Non-antalgic, appropriate coordination and balance   Neuro: Motor strength and sensation as noted below    Musculoskeletal:    Hip and Low back exam:    Inspection:     no visible deformity in the low back       normal skin       normal vascular       normal lymphatic       no asymmetry    Palpation:  Tender over the lumbar spine    Lumbar ROM: Forward flexion is decreased and painful.  Lateral flexion is decreased.      Hip ROM:  Bilateral hip ROM is decreased, left more so than right.  Right low back pain with right hip ROM.      Strength:  5/5 hip flexion/abduction/adduction, knee flexion/extension, ankle dorsiflexion/plantarflexion, great toe extension, toe flexion    Sensation:    grossly intact throughout lower extremities    Radiology:  Independent visualization of images performed and reviewed with Ed.    PELVIS AND HIP RIGHT ONE VIEW   4/16/2019 2:50 PM      HISTORY: Hip pain, right.     COMPARISON: None.     FINDINGS:  Subtle lucencies in the right greater trochanter on the  frog-leg lateral view likely represent artifact. Subtle fractures in  this region are considered less likely. There are small osteophytes  around the right femoral head and right acetabulum as well as the left  femoral head and left acetabulum most consistent with mild  degenerative  changes. Joint spaces are grossly well-maintained in the  bilateral hips. SI joints are unremarkable. No evidence for fracture  or malalignment. Overlying soft tissues are unremarkable.                                                                      IMPRESSION:  1. Mild degenerative changes of the bilateral hips.  2. Subtle lucency longitudinally projected over the greater trochanter  on the frog-leg lateral view likely represents artifact due to adipose  tissue in the leg. A very subtle fracture is considered less likely.  No other evidence for fracture.  3. No other abnormalities are identified.     HANSA ROYAL MD    LUMBAR SPINE TWO TO THREE VIEWS   3/4/2019 9:16 AM      HISTORY: Chronic back pain. Chronic bilateral low back pain without  sciatica.      COMPARISON: None.     FINDINGS: Alignment is significant for leftward deviation of the lower  lumbar spine with respect to the thoracolumbar junction. Multilevel  mild disc height loss is present throughout. Moderate disc height loss  is present at L4-5 and L5-S1. Large osteophytes are present projecting  anteriorly on the right most conspicuous at L1-2, L2-3, and L3-4.  Multilevel facet arthropathy is present most severe at L4-5 and L5-S1.  No fractures are identified. Vertebral body heights are maintained.  Slight wedging of the L1, L2, and L3 vertebral bodies is likely  physiologic/degenerative. Paraspinal soft tissues are unremarkable.                                                                      IMPRESSION: Moderate degenerative change.     DENVER FIGUEROA MD      Assessment:  1. Chronic bilateral low back pain without sciatica        Plan:  Discussed the assessment with the patient and developed a plan together:  -MRI of the lumbar spine ordered.  [facility] [phone]. Cost estimates can be provided by [facility] at [phone].    -Edward to follow up with primary care provider regarding elevated blood  pressure.    -Following completion of MRI in clinic. Please schedule at least 1-2 business days after MRI is completed to ensure we have the results of the MRI.      Dalia Duran MD, [facility] Sports Medicine  [facility]      Again, thank you for allowing me to participate in the care of your patient.        Sincerely,        Kelli Duran MD

## 2019-07-09 NOTE — PATIENT INSTRUCTIONS
-MRI of the lumbar spine ordered.  [facility] [phone]. Cost estimates can be provided by [facility] at [phone].    -Edward to follow up with Primary Care provider regarding elevated blood pressure.    -Following completion of MRI in clinic. Please schedule at least 1-2 business days after MRI is completed to ensure we have the results of the MRI.

## 2019-07-25 ENCOUNTER — HOSPITAL ENCOUNTER (OUTPATIENT)
Dept: MRI IMAGING | Facility: CLINIC | Age: 59
Discharge: HOME | End: 2019-07-25
Attending: PHYSICAL MEDICINE & REHABILITATION | Admitting: PHYSICAL MEDICINE & REHABILITATION
Payer: COMMERCIAL

## 2019-07-25 DIAGNOSIS — G89.29 CHRONIC BILATERAL LOW BACK PAIN WITHOUT SCIATICA: ICD-10-CM

## 2019-07-25 DIAGNOSIS — M54.50 CHRONIC BILATERAL LOW BACK PAIN WITHOUT SCIATICA: ICD-10-CM

## 2019-07-25 PROCEDURE — 72148 MRI LUMBAR SPINE W/O DYE: CPT

## 2019-08-05 ENCOUNTER — TELEPHONE (OUTPATIENT)
Dept: FAMILY MEDICINE | Facility: CLINIC | Age: 59
End: 2019-08-05

## 2019-08-05 NOTE — TELEPHONE ENCOUNTER
Panel Management Review      Patient has the following on his problem list:     Hypertension   Last three blood pressure readings:  BP Readings from Last 3 Encounters:   07/09/19 (!) 160/97   04/16/19 136/82   03/04/19 (!) 158/94     Blood pressure: FAILED    HTN Guidelines:  Less than 140/90      Composite cancer screening  Chart review shows that this patient is due/due soon for the following Fecal Colorectal (FIT)  Summary:    Patient is due/failing the following:   BP CHECK and FIT    Action needed:   Patient needs nurse only appointment.    Type of outreach:    Phone, spoke to patient.  Patient is on the nurse only schedule for a bp check due to being seein in the last 6 months.    Questions for provider review:    None                                                                                                                                    Rebecca Chavarria CMA      Chart routed to Care Team .

## 2019-08-06 ENCOUNTER — ALLIED HEALTH/NURSE VISIT (OUTPATIENT)
Dept: FAMILY MEDICINE | Facility: CLINIC | Age: 59
End: 2019-08-06

## 2019-08-06 VITALS — DIASTOLIC BLOOD PRESSURE: 86 MMHG | SYSTOLIC BLOOD PRESSURE: 138 MMHG

## 2019-08-06 DIAGNOSIS — I10 ESSENTIAL HYPERTENSION: Primary | ICD-10-CM

## 2019-08-06 PROCEDURE — 99207 ZZC NO CHARGE NURSE ONLY: CPT

## 2019-08-10 DIAGNOSIS — E55.9 VITAMIN D DEFICIENCY: ICD-10-CM

## 2019-08-12 NOTE — TELEPHONE ENCOUNTER
Requested Prescriptions   Pending Prescriptions Disp Refills     vitamin D2 (ERGOCALCIFEROL) 92948 units (1250 mcg) capsule [Pharmacy Med Name: VITAMIN D (ERGOCALCIFERO 50,000 CAPS] 12 capsule 0     Sig: TAKE 1 CAPSULE BY MOUTH ONCE A WEEK FOR 12 DOSES       There is no refill protocol information for this order        Last Written Prescription Date:  3/5/2019  Last Fill Quantity: 12,  # refills: 0   Last office visit: 4/16/2019 with prescribing provider:     Future Office Visit:      Routing refill request to provider for review/approval because:  Drug not on the [facility] refill protocol     Thelma Portillo RN

## 2019-08-13 RX ORDER — ERGOCALCIFEROL 1.25 MG/1
CAPSULE, LIQUID FILLED ORAL
Qty: 12 CAPSULE | Refills: 0 | OUTPATIENT
Start: 2019-08-13

## 2019-08-14 NOTE — TELEPHONE ENCOUNTER
I informed patient of the message below.  No further questions at this time.  Was looking for MRI results and I told him he needs to contact Dr. Duran and her team for the results.  Anthony Horton, CMA

## 2020-01-17 ENCOUNTER — TELEPHONE (OUTPATIENT)
Dept: SCHEDULING | Facility: CLINIC | Age: 60
End: 2020-01-17

## 2020-01-17 NOTE — TELEPHONE ENCOUNTER
1/17/2020    Call Regarding Preventive Health Screening Colonoscopy       Attempt 2    Message left with female    Comments:       Outreach   GB

## 2020-02-27 ENCOUNTER — TELEPHONE (OUTPATIENT)
Dept: FAMILY MEDICINE | Facility: CLINIC | Age: 60
End: 2020-02-27

## 2020-02-27 DIAGNOSIS — E55.9 VITAMIN D DEFICIENCY: ICD-10-CM

## 2020-02-27 RX ORDER — ERGOCALCIFEROL 1.25 MG/1
CAPSULE, LIQUID FILLED ORAL
Qty: 12 CAPSULE | Refills: 0 | OUTPATIENT
Start: 2020-02-27

## 2020-02-27 NOTE — TELEPHONE ENCOUNTER
Reason for Call:  Form, our goal is to have forms completed with 72 hours, however, some forms may require a visit or additional information.    Type of letter, form or note:  medical    Who is the form from?: Patient    Where did the form come from: Patient or family brought in       What clinic location was the form placed at?: [facility]    Where the form was placed: box Box/Folder    What number is listed as a contact on the form?: [phone]       Additional comments: thanks    Call taken on 2/27/2020 at 2:32 PM by Jayashree Teran

## 2020-02-27 NOTE — TELEPHONE ENCOUNTER
Requested Prescriptions   Pending Prescriptions Disp Refills     vitamin D2 (ERGOCALCIFEROL) 66566 units (1250 mcg) capsule [Pharmacy Med Name: VITAMIN D (ERGOCALCIFERO 50,000 CAPS] 12 capsule 0     Sig: TAKE 1 CAPSULE BY MOUTH ONCE A WEEK FOR 12 DOSES       There is no refill protocol information for this order        Last Written Prescription Date:  NA  Last Fill Quantity: NA,  # refills: NA   Last office visit: 4/6/2019 with prescribing provider:     Future Office Visit:   Next 5 appointments (look out 90 days)    Mar 17, 2020  2:20 PM CDT  Office Visit with Blaise Chance Mai, [facility]  [facility] [address] [place] [id number] [phone]         Routing refill request to provider for review/approval because:  Drug not on the [facility] refill protocol   Drug not active on patient's medication list    Thelma Portillo RN

## 2020-03-05 NOTE — TELEPHONE ENCOUNTER
Just received form on Monday 03/02/2020-Dr. Cox is out of the clinic until Monday 03/09/2020   Tatyana RAMIREZ

## 2020-03-10 NOTE — TELEPHONE ENCOUNTER
Was already scheduled for appointment on the 17th for a long visit- changed to a physical.   Chichi Lamar CMA

## 2020-03-10 NOTE — TELEPHONE ENCOUNTER
Not able to fill without the medical opinion form on his behalf.  His last clinic visit was in April 2019.  Please follow-up for it - recommend to follow-up as a physical exam

## 2021-01-20 ENCOUNTER — OFFICE VISIT (OUTPATIENT)
Dept: FAMILY MEDICINE | Facility: CLINIC | Age: 61
End: 2021-01-20
Payer: COMMERCIAL

## 2021-01-20 VITALS
RESPIRATION RATE: 12 BRPM | OXYGEN SATURATION: 99 % | HEART RATE: 80 BPM | WEIGHT: 171 LBS | SYSTOLIC BLOOD PRESSURE: 180 MMHG | DIASTOLIC BLOOD PRESSURE: 100 MMHG | TEMPERATURE: 99 F | BODY MASS INDEX: 25.33 KG/M2 | HEIGHT: 69 IN

## 2021-01-20 DIAGNOSIS — G89.29 CHRONIC BILATERAL LOW BACK PAIN WITHOUT SCIATICA: ICD-10-CM

## 2021-01-20 DIAGNOSIS — I10 ESSENTIAL HYPERTENSION: ICD-10-CM

## 2021-01-20 DIAGNOSIS — Z00.00 ROUTINE GENERAL MEDICAL EXAMINATION AT A HEALTH CARE FACILITY: Primary | ICD-10-CM

## 2021-01-20 DIAGNOSIS — R97.20 ELEVATED PROSTATE SPECIFIC ANTIGEN (PSA): ICD-10-CM

## 2021-01-20 DIAGNOSIS — F17.200 TOBACCO USE DISORDER: ICD-10-CM

## 2021-01-20 DIAGNOSIS — M54.50 CHRONIC BILATERAL LOW BACK PAIN WITHOUT SCIATICA: ICD-10-CM

## 2021-01-20 DIAGNOSIS — F10.21 ALCOHOL DEPENDENCE IN REMISSION (H): ICD-10-CM

## 2021-01-20 DIAGNOSIS — E55.9 VITAMIN D DEFICIENCY: ICD-10-CM

## 2021-01-20 LAB
ALBUMIN SERPL-MCNC: 4.1 G/DL (ref 3.4–5)
ALP SERPL-CCNC: 86 U/L (ref 40–150)
ALT SERPL W P-5'-P-CCNC: 16 U/L (ref 0–70)
ANION GAP SERPL CALCULATED.3IONS-SCNC: 3 MMOL/L (ref 3–14)
AST SERPL W P-5'-P-CCNC: 9 U/L (ref 0–45)
BILIRUB SERPL-MCNC: 0.6 MG/DL (ref 0.2–1.3)
BUN SERPL-MCNC: 15 MG/DL (ref 7–30)
CALCIUM SERPL-MCNC: 9.2 MG/DL (ref 8.5–10.1)
CHLORIDE SERPL-SCNC: 107 MMOL/L (ref 94–109)
CHOLEST SERPL-MCNC: 202 MG/DL
CO2 SERPL-SCNC: 30 MMOL/L (ref 20–32)
CREAT SERPL-MCNC: 0.8 MG/DL (ref 0.66–1.25)
ERYTHROCYTE [DISTWIDTH] IN BLOOD BY AUTOMATED COUNT: 12.2 % (ref 10–15)
GFR SERPL CREATININE-BSD FRML MDRD: >90 ML/MIN/{1.73_M2}
GLUCOSE SERPL-MCNC: 95 MG/DL (ref 70–99)
HCT VFR BLD AUTO: 45.5 % (ref 40–53)
HDLC SERPL-MCNC: 54 MG/DL
HGB BLD-MCNC: 15.3 G/DL (ref 13.3–17.7)
LDLC SERPL CALC-MCNC: 126 MG/DL
MCH RBC QN AUTO: 30.1 PG (ref 26.5–33)
MCHC RBC AUTO-ENTMCNC: 33.6 G/DL (ref 31.5–36.5)
MCV RBC AUTO: 90 FL (ref 78–100)
NONHDLC SERPL-MCNC: 148 MG/DL
PLATELET # BLD AUTO: 377 10E9/L (ref 150–450)
POTASSIUM SERPL-SCNC: 4 MMOL/L (ref 3.4–5.3)
PROT SERPL-MCNC: 7.6 G/DL (ref 6.8–8.8)
PSA SERPL-ACNC: 4.45 UG/L (ref 0–4)
RBC # BLD AUTO: 5.08 10E12/L (ref 4.4–5.9)
SODIUM SERPL-SCNC: 140 MMOL/L (ref 133–144)
TRIGL SERPL-MCNC: 111 MG/DL
TSH SERPL DL<=0.005 MIU/L-ACNC: 1.4 MU/L (ref 0.4–4)
WBC # BLD AUTO: 7.9 10E9/L (ref 4–11)

## 2021-01-20 PROCEDURE — G0103 PSA SCREENING: HCPCS | Performed by: FAMILY MEDICINE

## 2021-01-20 PROCEDURE — 80061 LIPID PANEL: CPT | Performed by: FAMILY MEDICINE

## 2021-01-20 PROCEDURE — 99396 PREV VISIT EST AGE 40-64: CPT | Performed by: FAMILY MEDICINE

## 2021-01-20 PROCEDURE — 99213 OFFICE O/P EST LOW 20 MIN: CPT | Mod: 25 | Performed by: FAMILY MEDICINE

## 2021-01-20 PROCEDURE — 85027 COMPLETE CBC AUTOMATED: CPT | Performed by: FAMILY MEDICINE

## 2021-01-20 PROCEDURE — 80053 COMPREHEN METABOLIC PANEL: CPT | Performed by: FAMILY MEDICINE

## 2021-01-20 PROCEDURE — 82306 VITAMIN D 25 HYDROXY: CPT | Performed by: FAMILY MEDICINE

## 2021-01-20 PROCEDURE — 36415 COLL VENOUS BLD VENIPUNCTURE: CPT | Performed by: FAMILY MEDICINE

## 2021-01-20 PROCEDURE — 84443 ASSAY THYROID STIM HORMONE: CPT | Performed by: FAMILY MEDICINE

## 2021-01-20 RX ORDER — TAMSULOSIN HYDROCHLORIDE 0.4 MG/1
0.4 CAPSULE ORAL AT BEDTIME
Qty: 30 CAPSULE | Refills: 0 | Status: SHIPPED | OUTPATIENT
Start: 2021-01-20 | End: 2021-02-24

## 2021-01-20 RX ORDER — METHOCARBAMOL 500 MG/1
TABLET, FILM COATED ORAL
Qty: 60 TABLET | Refills: 0 | Status: SHIPPED | OUTPATIENT
Start: 2021-01-20 | End: 2023-10-06

## 2021-01-20 RX ORDER — ATENOLOL AND CHLORTHALIDONE TABLET 50; 25 MG/1; MG/1
0.5 TABLET ORAL DAILY
Qty: 30 TABLET | Refills: 0 | Status: SHIPPED | OUTPATIENT
Start: 2021-01-20 | End: 2021-02-24

## 2021-01-20 RX ORDER — CELECOXIB 200 MG/1
200 CAPSULE ORAL DAILY
Qty: 30 CAPSULE | Refills: 0 | Status: SHIPPED | OUTPATIENT
Start: 2021-01-20 | End: 2023-10-06

## 2021-01-20 ASSESSMENT — MIFFLIN-ST. JEOR: SCORE: 1567.38

## 2021-01-20 ASSESSMENT — PAIN SCALES - GENERAL: PAINLEVEL: EXTREME PAIN (8)

## 2021-01-20 NOTE — PROGRESS NOTES
SUBJECTIVE:   CC: Iftikhar DOVE is an 61 year old male who presents for preventative health visit.       Patient has been advised of split billing requirements and indicates understanding: Yes  Healthy Habits:    Getting at least 3 servings of Calcium per day:  NO    Bi-annual eye exam:  NO    Dental care twice a year:  NO    Sleep apnea or symptoms of sleep apnea:  None    Diet:  Regular (no restrictions) and Low salt    Frequency of exercise:  None    Taking medications regularly:  Yes    Barriers to taking medications:  None    Medication side effects:  None    PHQ-2 Total Score:    Additional concerns today:  Yes (see note )        Patient want's results from MRI  7/25/2019 and is asking for paperwork related to that but does ti have it with him today- [place] the county.        Iftikhar is here today for his general physical with a couple of concerns.    1.  Follow-up on high blood pressure.  Was on lisinopril but stopped taking it for a while.  Not checking his blood pressure at home.  No headache or dizziness.  No chest pain or shortness of breath.  No leg swelling, orthopnea or dyspnea.  Denies of excessive salt intake.  Not exercising.    2.  Follow-up on his chronic low back pain for which he has had for years.  Constant achy pain, starts in the low back that radiates to the left hip.  Has gotten worse slowly and is worse with twisting his back or weight bearing activities.  Saw the sport medicine last year and had an MRI done but has not heard about the result.  I would like to discuss about the MRI result.  Also would like to to have his pain addressed.  Diclofenac has not helped.  The pain is bothering him.  The same kind of pain that he has had.  Back feel stiff, especially in the morning.  No numbness or tingling sensation.  No weakness.  No fever or chills.  No recent history of trauma or unusual activities.    3.  He has a history of alcoholism but has not been drinking  heavily for couple years - drinks rarely and socially.  He has no concern about it.  No craving.     4. Otherwise, he is doing ok. No major medical care or procedure done since the last physical over a year ago. No headache or dizziness.  No acute visual change. No running nose, nasal congestion, coughing, fever or chill. No CP/SOB. No N/V/D/C. Has a hard time to initiate urination with urgency at times. Get up to 3 times a night for urination.  Known to have elevated PSA.  No abdominal pain.  Denied of STD risks.  No leg swelling.  Not exercising.  Denied of drug use.  Smoking about 1/2 ppd, not ready to quit. No problem with sleeping.  Feels safe at home - lives with nephew and his family.  No weight change and denied of being depressed. No other concern today      Today's PHQ-2 Score:   PHQ-2 ( 1999 Pf[employer]) 3/4/2019   Q1: Little interest or pleasure in doing things 0   Q2: Feeling down, depressed or hopeless 0   PHQ-2 Score 0       Abuse: Current or Past(Physical, Sexual or Emotional)- No  Do you feel safe in your environment? Yes    Have you ever done Advance Care Planning? (For example, a Health Directive, POLST, or a discussion with a medical provider or your loved ones about your wishes): No, advance care planning information given to patient to review.  Patient declined advance care planning discussion at this time.    Social History     Tobacco Use     Smoking status: Current Every Day Smoker     Packs/day: 1.00     Years: 50.00     Pack years: 50.00     Smokeless tobacco: Never Used   Substance Use Topics     Alcohol use: Not Currently     Alcohol/week: 35.0 standard drinks     Types: 42 Cans of beer per week     Comment: used to drink heavily - stopped since 1/19/2019     If you drink alcohol do you typically have >3 drinks per day or >7 drinks per week? No    No flowsheet data found.    Last PSA:   PSA   Date Value Ref Range Status   03/04/2019 4.43 (H) 0 - 4 ug/L Final     Comment:     Assay Method:   Chemiluminescence using Siemens Vista analyzer       Reviewed orders with patient. Reviewed health maintenance and updated orders accordingly - Yes  BP Readings from Last 3 Encounters:   21 (!) 180/100   19 138/86   19 (!) 160/97    Wt Readings from Last 3 Encounters:   21 77.6 kg (171 lb)   19 77.6 kg (171 lb)   19 80.3 kg (177 lb)                  Patient Active Problem List   Diagnosis     Alcohol dependence (H)     Chronic bilateral low back pain without sciatica     Essential hypertension     Elevated prostate specific antigen (PSA)     Vitamin D deficiency     Umbilical hernia without obstruction and without gangrene     Past Surgical History:   Procedure Laterality Date     NO HISTORY OF SURGERY         Social History     Tobacco Use     Smoking status: Current Every Day Smoker     Packs/day: 0.50     Years: 50.00     Pack years: 25.00     Smokeless tobacco: Never Used   Substance Use Topics     Alcohol use: Yes     Alcohol/week: 35.0 standard drinks     Types: 42 Cans of beer per week     Comment: used  heavily - rarely since 2019     Family History   Problem Relation Age of Onset     Cancer Mother         colon -  75     Cancer Father         brain tumor     Unknown/Adopted Maternal Grandmother      Unknown/Adopted Maternal Grandfather      Unknown/Adopted Paternal Grandmother      Unknown/Adopted Paternal Grandfather      Unknown/Adopted Sister      No Known Problems Son      Unknown/Adopted Sister      Unknown/Adopted Sister      Unknown/Adopted Sister      No Known Problems Son          Current Outpatient Medications   Medication Sig Dispense Refill     atenolol-chlorthalidone (TENORETIC) 50-25 MG tablet Take 0.5 tablets by mouth daily 30 tablet 0     celecoxib (CELEBREX) 200 MG capsule Take 1 capsule (200 mg) by mouth daily 30 capsule 0     lisinopril (PRINIVIL/ZESTRIL) 10 MG tablet TAKE ONE TABLET BY MOUTH ONCE DAILY 90 tablet 2     methocarbamol (ROBAXIN)  "500 MG tablet Take 1 tablet in am and 1 at bedtime 60 tablet 0     tamsulosin (FLOMAX) 0.4 MG capsule Take 1 capsule (0.4 mg) by mouth At Bedtime 30 capsule 0     Allergies   Allergen Reactions     Penicillins Unknown       Reviewed and updated as needed this visit by clinical staff                 Reviewed and updated as needed this visit by Provider                Past Medical History:   Diagnosis Date     Hypertension       Past Surgical History:   Procedure Laterality Date     NO HISTORY OF SURGERY         Review of Systems  Please see subjective otherwise the complete review system was negative.    OBJECTIVE:   BP (!) 180/100   Pulse 80   Temp 99  F (37.2  C)   Resp 12   Ht 1.763 m (5' 9.4\")   Wt 77.6 kg (171 lb)   SpO2 99%   BMI 24.96 kg/m      Physical Exam   GENERAL: alert and no distress, speaking full sentences  EYES: Eyes grossly normal to inspection, PERRL and conjunctivae and sclerae normal.  No nystagmus.  All 4 visual fields are intact  HENT: Ear canals and TM's normal.  Nares are non-congested. Oropharynx is pink and moist. No tender with palpation to the sinuses.  NECK: no adenopathy, supple, no lymphadenopathy or thyromegaly.  No tender with palpation to the cervical spine or its paraspinous muscle.  RESP: lungs clear to auscultation - no rales, rhonchi or wheezes  CV: regular rate and rhythm, no murmur.  ABDOMEN: soft, nondistended, nontender, no palpable masses or organomegaly with normal bowel sounds.  MS: no gross musculoskeletal defects noted, no edema.  Walk with no limping, somewhat unstable gait with positional change from sitting to standing.  All 4 extremities are equally in strength. Ankle, knees, hips, shoulders, elbows and wrists exams normal.  Negative straight leg maneuver.  Back is straight, no lordosis or scoliosis and is tender with palpation to the lumbar spine.  SKIN: no suspicious lesions or rashes  NEURO: Normal strength and tone, mentation intact and speech normal.  " Cranial nerves II through XII intact, DTR +2 throughout, no focal neurological deficit.  PSYCH: mentation appears normal, affect normal/bright.  Thoughts intact, no hallucination.  No suicidal or homicidal ideation.  LYMPH: no cervical, supraclavicular or axillary adenopathy.   (male): Offered and recommended but he declined.      Diagnostic Test Results:  Labs reviewed in Epic  Results for orders placed or performed in visit on 01/20/21   Comprehensive metabolic panel     Status: None   Result Value Ref Range    Sodium 140 133 - 144 mmol/L    Potassium 4.0 3.4 - 5.3 mmol/L    Chloride 107 94 - 109 mmol/L    Carbon Dioxide 30 20 - 32 mmol/L    Anion Gap 3 3 - 14 mmol/L    Glucose 95 70 - 99 mg/dL    Urea Nitrogen 15 7 - 30 mg/dL    Creatinine 0.80 0.66 - 1.25 mg/dL    GFR Estimate >90 >60 mL/min/[1.73_m2]    GFR Estimate If Black >90 >60 mL/min/[1.73_m2]    Calcium 9.2 8.5 - 10.1 mg/dL    Bilirubin Total 0.6 0.2 - 1.3 mg/dL    Albumin 4.1 3.4 - 5.0 g/dL    Protein Total 7.6 6.8 - 8.8 g/dL    Alkaline Phosphatase 86 40 - 150 U/L    ALT 16 0 - 70 U/L    AST 9 0 - 45 U/L   Lipid panel reflex to direct LDL Fasting     Status: Abnormal   Result Value Ref Range    Cholesterol 202 (H) <200 mg/dL    Triglycerides 111 <150 mg/dL    HDL Cholesterol 54 >39 mg/dL    LDL Cholesterol Calculated 126 (H) <100 mg/dL    Non HDL Cholesterol 148 (H) <130 mg/dL   PROSTATE SPEC ANTIGEN SCREEN     Status: Abnormal   Result Value Ref Range    PSA 4.45 (H) 0 - 4 ug/L   TSH with free T4 reflex     Status: None   Result Value Ref Range    TSH 1.40 0.40 - 4.00 mU/L   Vitamin D Deficiency     Status: Abnormal   Result Value Ref Range    Vitamin D Deficiency screening 17 (L) 20 - 75 ug/L   CBC with platelets     Status: None   Result Value Ref Range    WBC 7.9 4.0 - 11.0 10e9/L    RBC Count 5.08 4.4 - 5.9 10e12/L    Hemoglobin 15.3 13.3 - 17.7 g/dL    Hematocrit 45.5 40.0 - 53.0 %    MCV 90 78 - 100 fl    MCH 30.1 26.5 - 33.0 pg    MCHC 33.6  31.5 - 36.5 g/dL    RDW 12.2 10.0 - 15.0 %    Platelet Count 377 150 - 450 10e9/L       ASSESSMENT/PLAN:   1. Routine general medical examination at a health care facility  Iftikhar has several chronic medical condition that need to be better managed.  He has not been taking medications as prescribed.  Please see below for further details and he was strongly recommended the medication prescribed.  Recommend Pneumovax, shingle and flu vaccination but he declined.  Discussed about safety issue, healthy diet, exercising, fall prevention, alcohol/drugs misuse prevention, depression prevention and good sleeping hygiene.  Recommended daily exercise for at least 30 minutes or as tolerated.  Emphasized on healthy diet with adequate fluid intake and resting. Feels safe at home.  Follow in 1 year, earlier as needed.  Labs as ordered below.      Discussed with him about options for colon cancer screening which include colonoscopy, Cologuard or FIT test.  Pros and cons of each option discussed.  He preferred FIT test.  He understands that positive FIT test will require further evaluation, including colonoscopy.  He was also informed that FIT is recommended to be done annually.       Discussed with him about the pros and cons of prostate screening cancer with PSA.  Also educated about the potential false positive which may require unnecessary work-up.  He was informed of negative/normal PSA leve does not rule out prostate cancer completely although it is very unlikely.  He understands and is willing to take the risk.     All of his questions were answered.      - Comprehensive metabolic panel  - Lipid panel reflex to direct LDL Fasting  - PROSTATE SPEC ANTIGEN SCREEN  - TSH with free T4 reflex  - Vitamin D Deficiency  - CBC with platelets  - Fecal colorectal cancer screen (FIT); Future    2. Essential hypertension  Not controlled and his BP is high today.  Been off the medication for awhile.  Discussed with him about the nature of  the condition and emphasized the importance of having it under controlled. Educated him about the long and short term consequences of uncontrolled HTN as well as the goal for his blood pressure.  Started him on Tenoretic.  Encouraged daily exercise, healthy/low-salt diet and avoid high caffeine intake. Lab ordered today CMP and CBC.  Return in a week to 10 days for blood pressure for blood pressure check with the nursing staff.    - atenolol-chlorthalidone (TENORETIC) 50-25 MG tablet; Take 0.5 tablets by mouth daily  Dispense: 30 tablet; Refill: 0    3. Elevated prostate specific antigen (PSA)  His prostate enzyme was elevated last year and again this year - about the same.  He also displayed symptoms of BPH.  I discussed with him about the significance of the finding and informed him that it is most likely due to enlarged prostate.  However, prostate cancer need to be ruled out.  Referred to urology for further evaluation and management.  He agreed with the plan    - PROSTATE SPEC ANTIGEN SCREEN  - tamsulosin (FLOMAX) 0.4 MG capsule; Take 1 capsule (0.4 mg) by mouth At Bedtime  Dispense: 30 capsule; Refill: 0    4. Chronic bilateral low back pain without sciatica  Reviewed the 2019 lumbar spine MRI result with patient which indicated of multilevel degenerative changes of the lumbar spine with the most pronounced finding at L4-L5 and L5-S1 with bulging disc and annular fissure; severe right and moderate to severe left facet arthropathy as well as mild to moderate left neuroforaminal stenosis.  Discussed with him about the significance of the finding.  Exam today showed no focal neurological deficit.  Clinical presentation did not suggest equina syndrome or infection.  Will refer him to physical therapy.  Also started him on Celebrex for pain and Robaxin for muscle spasm.  Side effect discussed.    - methocarbamol (ROBAXIN) 500 MG tablet; Take 1 tablet in am and 1 at bedtime  Dispense: 60 tablet; Refill: 0  -  "PHYSICAL THERAPY REFERRAL; Future  - celecoxib (CELEBREX) 200 MG capsule; Take 1 capsule (200 mg) by mouth daily  Dispense: 30 capsule; Refill: 0    5. Alcohol dependence in remission (H)  He has a long history of alcoholism but has been in remission in the last couple years.  He now drinks rarely.  Encouraged him to keep the good work with sobering.    6. Vitamin D deficiency  Food with high vitamin D sources discussed and recommended.  Started him on the vitamin D supplement.    - Vitamin D Deficiency    7.  Tobacco use disorder  Iftikhar has been smoking for years.  Discussed with him about the long and short term consequences of tobacco smoking.  Discussed with him about different options for smoking cessation aids.  He is not ready to quit smoking at this time.  Encourage to let me know when he is ready to quit.  In a mean time, I encourage him to reduce to amount of cigarrets smoke as much as possible.  All of his questions were answered.     Patient has been advised of split billing requirements and indicates understanding: No  COUNSELING:   Reviewed preventive health counseling, as reflected in patient instructions       Regular exercise       Healthy diet/nutrition       Vision screening       Hearing screening       Immunizations    Declined: Influenza, Pneumococcal and Zoster due to Conscientious objector               Aspirin prophylaxis        Alcohol Use        Consider Hep C screening for all patients one time for ages 18-79 years       Colon cancer screening       Prostate cancer screening       Osteoporosis prevention/bone health       Consider lung cancer screening for ages 55-80 years and 30 pack-year smoking history         One time pneumovax for smokers       Advance Care Planning    Estimated body mass index is 24.54 kg/m  as calculated from the following:    Height as of 3/4/19: 1.778 m (5' 10\").    Weight as of 7/9/19: 77.6 kg (171 lb).         He reports that he has been smoking. He has a " 50.00 pack-year smoking history. He has never used smokeless tobacco.  Tobacco Cessation Action Plan:   Information offered: Patient not interested at this time      Counseling Resources:  ATP IV Guidelines  Pooled Cohorts Equation Calculator  FRAX Risk Assessment  ICSI Preventive Guidelines  Dietary Guidelines for Americans, 2010  USDA's MyPlate  ASA Prophylaxis  Lung CA Screening    Blaise Chance Mai, MD  [facility]

## 2021-01-21 LAB — DEPRECATED CALCIDIOL+CALCIFEROL SERPL-MC: 17 UG/L (ref 20–75)

## 2021-01-22 ENCOUNTER — TELEPHONE (OUTPATIENT)
Dept: FAMILY MEDICINE | Facility: CLINIC | Age: 61
End: 2021-01-22

## 2021-01-22 PROBLEM — F17.200 TOBACCO USE DISORDER: Status: ACTIVE | Noted: 2021-01-22

## 2021-01-22 PROBLEM — F10.20 ALCOHOL DEPENDENCE (H): Status: RESOLVED | Noted: 2019-03-04 | Resolved: 2021-01-22

## 2021-01-22 PROBLEM — F10.21 ALCOHOL DEPENDENCE IN REMISSION (H): Status: ACTIVE | Noted: 2021-01-22

## 2021-01-22 RX ORDER — CHOLECALCIFEROL (VITAMIN D3) 50 MCG
1 TABLET ORAL DAILY
Qty: 100 TABLET | Refills: 3 | Status: SHIPPED | OUTPATIENT
Start: 2021-01-22 | End: 2021-02-24

## 2021-01-22 NOTE — TELEPHONE ENCOUNTER
Called and LM for patient to call back. Please relay results below from Dr. Cox. If patient is okay with Vitamin D script please see what pharmacy he would like this sent to? Also let us know if patient is okay with Urology consult. Referral has been pended.   Brooke Rodriguez MA

## 2021-01-22 NOTE — TELEPHONE ENCOUNTER
----- Message from Blaise Chance Mai, MD sent at 1/22/2021 10:36 AM CST -----  Please let patient know that his vitamin D level is low, will send a prescription at the vitamin D and encouraged him to take it as prescribed.  His prostate enzyme remains to be high, overall about the same it was last year.  Will also refer him to urologist for further evaluation and potential management.  His kidney function test, liver function test and electrolytes were normal, no diabetes.  His cholesterol is slightly elevated, encouraged him to work on exercise and healthy diet as discussed.  Recommended recheck in a year and will go from there.  CBC was normal, no anemia.  Blaise Cox MD.

## 2021-01-22 NOTE — LETTER
January 26, 2021      Iftikhar DOVE  [address] [address] [place] [id number]        Dear Iftikhar,     We have been trying to reach you, please see the below msg from Dr. Cox and let us know if you are ok with seeing Urology and we can put in the orders.      ----- Message from Blaise Chance Mai, MD sent at 1/22/2021 10:36 AM CST -----  Please let patient know that his vitamin D level is low, will send a prescription at the vitamin D and encouraged him to take it as prescribed.  His prostate enzyme remains to be high, overall about the same it was last year.  Will also refer him to urologist for further evaluation and potential management.  His kidney function test, liver function test and electrolytes were normal, no diabetes.  His cholesterol is slightly elevated, encouraged him to work on exercise and healthy diet as discussed.  Recommended recheck in a year and will go from there.  CBC was normal, no anemia.  Blaise Cox MD.      Sincerely,        Blaise Chance Mai, MD

## 2021-01-25 NOTE — TELEPHONE ENCOUNTER
2nd attempt called and LM for patient to back please relay message below.       Routing to provider to advise if he would like us to send letter to patient.     Brooke Rodriguez MA

## 2021-02-24 ENCOUNTER — OFFICE VISIT (OUTPATIENT)
Dept: FAMILY MEDICINE | Facility: CLINIC | Age: 61
End: 2021-02-24
Payer: COMMERCIAL

## 2021-02-24 VITALS
TEMPERATURE: 99 F | RESPIRATION RATE: 16 BRPM | DIASTOLIC BLOOD PRESSURE: 76 MMHG | WEIGHT: 172.2 LBS | BODY MASS INDEX: 25.14 KG/M2 | OXYGEN SATURATION: 96 % | SYSTOLIC BLOOD PRESSURE: 124 MMHG | HEART RATE: 86 BPM

## 2021-02-24 DIAGNOSIS — G89.29 CHRONIC BILATERAL LOW BACK PAIN WITHOUT SCIATICA: ICD-10-CM

## 2021-02-24 DIAGNOSIS — F10.21 ALCOHOL DEPENDENCE IN REMISSION (H): ICD-10-CM

## 2021-02-24 DIAGNOSIS — I10 ESSENTIAL HYPERTENSION: Primary | ICD-10-CM

## 2021-02-24 DIAGNOSIS — R97.20 ELEVATED PROSTATE SPECIFIC ANTIGEN (PSA): ICD-10-CM

## 2021-02-24 DIAGNOSIS — E55.9 VITAMIN D DEFICIENCY: ICD-10-CM

## 2021-02-24 DIAGNOSIS — Z12.11 COLON CANCER SCREENING: ICD-10-CM

## 2021-02-24 DIAGNOSIS — M54.50 CHRONIC BILATERAL LOW BACK PAIN WITHOUT SCIATICA: ICD-10-CM

## 2021-02-24 PROBLEM — F10.20 ALCOHOL DEPENDENCE (H): Status: ACTIVE | Noted: 2021-02-24

## 2021-02-24 LAB — PSA SERPL-ACNC: 4.76 UG/L (ref 0–4)

## 2021-02-24 PROCEDURE — G0103 PSA SCREENING: HCPCS | Performed by: FAMILY MEDICINE

## 2021-02-24 PROCEDURE — 99214 OFFICE O/P EST MOD 30 MIN: CPT | Performed by: FAMILY MEDICINE

## 2021-02-24 PROCEDURE — 36415 COLL VENOUS BLD VENIPUNCTURE: CPT | Performed by: FAMILY MEDICINE

## 2021-02-24 RX ORDER — ATENOLOL AND CHLORTHALIDONE TABLET 50; 25 MG/1; MG/1
0.5 TABLET ORAL DAILY
Qty: 45 TABLET | Refills: 1 | Status: SHIPPED | OUTPATIENT
Start: 2021-02-24 | End: 2021-09-29

## 2021-02-24 RX ORDER — CHOLECALCIFEROL (VITAMIN D3) 50 MCG
1 TABLET ORAL DAILY
Qty: 100 TABLET | Refills: 3 | Status: SHIPPED | OUTPATIENT
Start: 2021-02-24

## 2021-02-24 RX ORDER — TAMSULOSIN HYDROCHLORIDE 0.4 MG/1
0.4 CAPSULE ORAL AT BEDTIME
Qty: 90 CAPSULE | Refills: 1 | Status: SHIPPED | OUTPATIENT
Start: 2021-02-24 | End: 2023-10-06

## 2021-02-24 ASSESSMENT — PAIN SCALES - GENERAL: PAINLEVEL: SEVERE PAIN (6)

## 2021-02-24 NOTE — PROGRESS NOTES
Assessment & Plan     Essential hypertension  BP is normal and stable. Continue with the current doses of Tenoretic, been tolerating it well.  Emphasized on healthy/low salt diet and daily excercise.  Avoid high sugar/caffeine intake. Follow up in 6 month, earlier as needed.    - atenolol-chlorthalidone (TENORETIC) 50-25 MG tablet; Take 0.5 tablets by mouth daily    Elevated prostate specific antigen (PSA)  Refer to urology for further evaluation and management.  He was started on Flomax at the last visit which has been working well.  Will continue with the Flomax.  - PSA, screen    - tamsulosin (FLOMAX) 0.4 MG capsule; Take 1 capsule (0.4 mg) by mouth At Bedtime    Chronic bilateral low back pain without sciatica  Due to arthritis.  2019 lumbar spine MRI result with patient which indicated of multilevel degenerative changes of the lumbar spine with the most pronounced finding at L4-L5 and L5-S1 with bulging disc and annular fissure; severe right and moderate to severe left facet arthropathy as well as mild to moderate left neuroforaminal stenosis.  Continue with the Celebrex and Robaxin as needed.  Normal activity as tolerated.  Also sent for physical therapy.    Alcohol dependence in remission (H)  Encouraged him to keep the good work with its remission and to avoid consuming any amount of alcohol.    Vitamin D deficiency    - vitamin D3 (CHOLECALCIFEROL) 50 mcg (2000 units) tablet; Take 1 tablet (50 mcg) by mouth daily    Colon cancer screening  Discussed with him about options for colon cancer screening which include colonoscopy, Cologuard or FIT test.  Pros and cons of each option discussed.  He preferred FIT test.  He understands that positive FIT test will require further evaluation with colonoscopy.  He also informed that FIT is to be done annually.     - Fecal colorectal cancer screen (FIT); Future        Return in about 6 months (around 8/24/2021) for folow up hypertension.    Blaise Chance Mai, MD  [facility]  [facility]    Win Buitrago is a 61 year old who presents for the following health issues     HPI     Follow up on labs from last visit.    Hypertension Follow-up      Do you check your blood pressure regularly outside of the clinic? No     Are you following a low salt diet? Yes    Are your blood pressures ever more than 140 on the top number (systolic) OR more   than 90 on the bottom number (diastolic), for example 140/90? No      How many servings of fruits and vegetables do you eat daily?  0-1    On average, how many sweetened beverages do you drink each day (Examples: soda, juice, sweet tea, etc.  Do NOT count diet or artificially sweetened beverages)?   3    How many days per week do you exercise enough to make your heart beat faster? 3 or less    How many minutes a day do you exercise enough to make your heart beat faster? 60 or more    How many days per week do you miss taking your medication? 0    Iftikhar is here today for couple concerns.  First is to follow-up on his high blood pressure.  He was seen a month ago and please see my last dictation for further details.  His blood pressure at that time was high, 180/100 with a pulse of 80.  He has high blood pressure for years and was on lisinopril but not sure how he did with it because he was off of the medication for a while before his last blood pressure check.  He was started on the Tenoretic which been taking as prescribed.  No side effect.  Not checking his blood pressure at home.  No headache or dizziness.  No leg swelling.  No orthopnea or dyspnea.  No chest pain or shortness of breath.    He also has chronic low back pain that he has for years.  He was started on Celebrex and Robaxin at the last visit which he has been taking it as needed and it has has helped.  The pain is tolerable this point.  No side effect from the medications.  Still has a lot of pain with prolonged standing or walking, bending, lifting or kneeing.  Has not  been working since 2004.  He used to be a . Never been trained for any kind of work.  He needed medical opinion form to fill out his behalf to get assistance from the [facility].    He used to drink alcohol heavily but been in remission for years.  He is now drinking rarely or socially, no more 1 or 2 beers each time.    His PSA was elevated last year but ne never follow-up with the urology referral.  Last month, PSA level remained to be elevated.  Been having urinary frequently and nocturia.  Her history of prostate cancer.      Review of Systems   Constitutional, HEENT, cardiovascular, pulmonary, gi and gu systems are negative, except as otherwise noted.      Objective    /76   Pulse 86   Temp 99  F (37.2  C) (Temporal)   Resp 16   Wt 78.1 kg (172 lb 3.2 oz)   SpO2 96%   BMI 25.14 kg/m    Body mass index is 25.14 kg/m .  Physical Exam   GENERAL:  alert and no distress  RESP: lungs clear to auscultation - no rales, rhonchi or wheezes  CV: regular rate and rhythm, no murmur  ABDOMEN: soft, nontender, nondistended, normal bowel sounds  MS: no gross musculoskeletal defects noted, no edema.  Both legs are equally in strength.  Tender with ice in the lumbosacral spine.    Results for orders placed or performed in visit on 02/24/21   PSA, screen     Status: Abnormal   Result Value Ref Range    PSA 4.76 (H) 0 - 4 ug/L

## 2021-02-25 ENCOUNTER — TELEPHONE (OUTPATIENT)
Dept: FAMILY MEDICINE | Facility: CLINIC | Age: 61
End: 2021-02-25

## 2021-02-25 NOTE — TELEPHONE ENCOUNTER
LMTCB, please inform pt of msg below from provider.  ................Zaki Moses LPN,   February 25, 2021,      8:59 AM,   [facility]

## 2021-02-25 NOTE — TELEPHONE ENCOUNTER
----- Message from Blaise Chance Mai, MD sent at 2/24/2021  7:09 PM CST -----  Please let patient know that his prostate enzyme remained to be elevated.  Will refer him to urologist for further evaluation.  It is very important for him to follow-up with the urologist for further evaluation, to rule out prostate cancer.     Blaise Cox MD.

## 2021-02-25 NOTE — RESULT ENCOUNTER NOTE
Called pt - LMTCB.  ................Zaki Moses LPN,   February 25, 2021,      9:03 AM,   [facility]

## 2021-03-03 ENCOUNTER — HOSPITAL ENCOUNTER (OUTPATIENT)
Dept: PHYSICAL THERAPY | Facility: CLINIC | Age: 61
Setting detail: THERAPIES SERIES
End: 2021-03-03
Attending: FAMILY MEDICINE
Payer: COMMERCIAL

## 2021-03-03 DIAGNOSIS — G89.29 CHRONIC BILATERAL LOW BACK PAIN WITHOUT SCIATICA: ICD-10-CM

## 2021-03-03 DIAGNOSIS — M54.50 CHRONIC BILATERAL LOW BACK PAIN WITHOUT SCIATICA: ICD-10-CM

## 2021-03-03 PROCEDURE — 97161 PT EVAL LOW COMPLEX 20 MIN: CPT | Mod: GP | Performed by: PHYSICAL THERAPIST

## 2021-03-03 PROCEDURE — 97110 THERAPEUTIC EXERCISES: CPT | Mod: GP | Performed by: PHYSICAL THERAPIST

## 2021-03-03 NOTE — PROGRESS NOTES
03/03/21 1335   General Information   Type of Visit Initial OP Ortho PT Evaluation   Start of Care Date 03/03/21   Referring Physician Dr Cox    Patient/Family Goals Statement right low back pain for many years    Orders Evaluate and Treat   Date of Order 02/25/21   Certification Required? Yes   Medical Diagnosis chronic B LBP without sciatica M54.5   Surgical/Medical history reviewed Yes   Precautions/Limitations no known precautions/limitations   Body Part(s)   Body Part(s) Lumbar Spine/SI   Presentation and Etiology   Pertinent history of current problem (include personal factors and/or comorbidities that impact the POC) patient reports he has had R>L low back pain x 20 years , did therapy 1x and didnt help wasnt able to go very much . is retired watches alot of tv , used to work aime . PMH none reported denies numb or tingling or incottenence , left index finger is numb x 1 year    Impairments A. Pain   Functional Limitations perform activities of daily living;perform desired leisure / sports activities   Symptom Location R>L low back    Onset date of current episode/exacerbation 03/03/20   Chronicity Chronic   Pain rating (0-10 point scale) Best (/10);Worst (/10)   Best (/10) 7/10   Worst (/10) 8/10   Pain quality A. Sharp   Frequency of pain/symptoms A. Constant   Pain/symptoms exacerbated by E. Rest   Pain/symptoms eased by D. Nothing   Progression of symptoms since onset: Unchanged   Current / Previous Interventions   Diagnostic Tests: MRI   Prior Level of Function   Functional Level Prior Comment not really    Current Level of Function   Current Community Support Family/friend caregiver   Patient role/employment history F. Retired   Fall Risk Screen   Have you fallen 2 or more times in the past year? No   Have you fallen and had an injury in the past year? No   Is patient a fall risk? No   Fall screen comments 1x stood up to fast and leg gave out and went down    Abuse Screen (yes response referral  indicated)   Feels Unsafe at Home or Work/School no   Feels Threatened by Someone no   Does Anyone Try to Keep You From Having Contact with Others or Doing Things Outside Your Home? no   Physical Signs of Abuse Present no   Lumbar Spine/SI Objective Findings   Flexion ROM mod pain and stiff no change    Extension ROM mod pain and stiff no change    Right Side Bending ROM mod pain and stiff no change    Left Side Bending ROM mod pain and stiff no change    Lumbar ROM Comment rotation mod pain and stiff no change    Hip Screen right hip limited ROM R>L    Lumbar/Hip/Knee/Foot Strength Comments able to stand on heels and toes , BLE  grossly 4-/5    Hamstring Flexibility right 35 left 25   SLR neg   Sensation Testing denies numb or tingilng    Palpation pain accross B low back right > left    Planned Therapy Interventions   Planned Therapy Interventions ADL retraining;balance training;ROM;strengthening;stretching   Planned Modality Interventions   Planned Modality Interventions Comments modalities as needed    Clinical Impression   Criteria for Skilled Therapeutic Interventions Met yes, treatment indicated   PT Diagnosis chronic B low back pain    Functional limitations due to impairments zander med SUDEEP 48   Clinical Presentation Stable/Uncomplicated   Clinical Presentation Rationale patient seen due to chronic and progressive low back pain , presents with limited ROM , flexability and strength , limited function due to pain , Rx is skilled PT instruction in exercises and HEP , modalities as needed    Clinical Decision Making (Complexity) Low complexity   Predicted Duration of Therapy Intervention (days/wks) 1x week x 1-3 months    Risk & Benefits of therapy have been explained Yes   Patient, Family & other staff in agreement with plan of care Yes   Education Assessment   Preferred Learning Style Listening;Reading;Demonstration   Barriers to Learning No barriers   ORTHO GOALS   PT Ortho Eval Goals 1;2;3   Ortho Goal 1    Goal Identifier 1   Goal Description instruction in HEP and compliant with it 5 of 7 days to improve quality of life    Target Date 06/03/21   Ortho Goal 2   Goal Identifier 2   Goal Description patient to have overall decrease in pain by 50% currently 7-8/10    Target Date 06/03/21   Ortho Goal 3   Goal Identifier 3   Goal Description patient to have improved tolerance to activity currently SUDEEP 48 goal is 38   Target Date 06/03/21   Total Evaluation Time   PT Eval, Low Complexity Minutes (04802) 15   Therapy Certification   Certification date from 03/03/21   Certification date to 06/03/21   Medical Diagnosis chronic B LBP without sciatica M54.5

## 2021-03-03 NOTE — PROGRESS NOTES
[facility]          OUTPATIENT PHYSICAL THERAPY ORTHOPEDIC EVALUATION  PLAN OF TREATMENT FOR OUTPATIENT REHABILITATION  (COMPLETE FOR INITIAL CLAIMS ONLY)  Patient's Last Name, First Name, M.I.  YOB: 1960  Iftikhar DOVE    Provider s Name:  [facility]   Medical Record No.  [id number]   Start of Care Date:  03/03/21   Onset Date:  03/03/20   Type:     _X__PT   ___OT   ___SLP Medical Diagnosis:  chronic B LBP without sciatica M54.5     PT Diagnosis:  chronic B low back pain    Visits from SOC:  1      _________________________________________________________________________________  Plan of Treatment/Functional Goals:  ADL retraining, balance training, ROM, strengthening, stretching        modalities as needed   Goals  Goal Identifier: 1  Goal Description: instruction in HEP and compliant with it 5 of 7 days to improve quality of life   Target Date: 06/03/21    Goal Identifier: 2  Goal Description: patient to have overall decrease in pain by 50% currently 7-8/10   Target Date: 06/03/21    Goal Identifier: 3  Goal Description: patient to have improved tolerance to activity currently SUDEEP 48 goal is 38  Target Date: 06/03/21                                                           Therapy Frequency:     Predicted Duration of Therapy Intervention:  1x week x 1-3 months     Catrachita Rascon, PT                 I CERTIFY THE NEED FOR THESE SERVICES FURNISHED UNDER        THIS PLAN OF TREATMENT AND WHILE UNDER MY CARE     (Physician co-signature of this document indicates review and certification of the therapy plan).                       Certification Date From:  03/03/21   Certification Date To:  06/03/21    Referring Provider:  Dr Cox     Initial Assessment        See Epic Evaluation Start of Care Date: 03/03/21

## 2021-03-18 ENCOUNTER — HOSPITAL ENCOUNTER (OUTPATIENT)
Dept: PHYSICAL THERAPY | Facility: CLINIC | Age: 61
Setting detail: THERAPIES SERIES
End: 2021-03-18
Attending: FAMILY MEDICINE
Payer: COMMERCIAL

## 2021-03-18 PROCEDURE — 97110 THERAPEUTIC EXERCISES: CPT | Mod: GP | Performed by: PHYSICAL THERAPIST

## 2021-04-05 NOTE — PROGRESS NOTES
Outpatient Physical Therapy Discharge Note     Patient: Iftikhar DOVE  : 1960    Beginning/End Dates of Reporting Period:  3/3/21 to 2021    Referring Provider: Dr Cox     Therapy Diagnosis: chronic low back pain      Client Self Report: felt alittle sore after Rx last time , HEP hasnt been doing them and kind of busy     Objective Measurements:      patient seen for 2 Rx sessions for exercises in clinic and instruction in HEP at last Rx he was completing the following   bridges , SKC, supine bent knee trunk rotation , SLR, hip abduction , prone knee flexion 10x sitting hamstring stretch hold 10 x 3 , nustep x 10  HEP modified standing for hip abduction and knee flexion     Patient no showed for 3 Rx sessions and has not followed up with PT          Goals:  Goal Identifier 1   Goal Description instruction in HEP and compliant with it 5 of 7 days to improve quality of life    Target Date 21   Date Met      Progress:     Goal Identifier 2   Goal Description patient to have overall decrease in pain by 50% currently 7-8/10    Target Date 21   Date Met      Progress:     Goal Identifier 3   Goal Description patient to have improved tolerance to activity currently SUDEEP 48 goal is 38   Target Date 21   Date Met      Progress:         Progress Toward Goals:   Progress this reporting period: patient no showed for 3 Rx and has not followed up with PT           Plan:  Discharge from therapy.    Discharge:    Reason for Discharge: No further expectation of progress.  Patient chooses to discontinue therapy.  Patient has failed to schedule further appointments.    Equipment Issued: none    Discharge Plan: Patient to continue home program.

## 2021-09-28 DIAGNOSIS — I10 ESSENTIAL HYPERTENSION: ICD-10-CM

## 2021-09-29 RX ORDER — ATENOLOL AND CHLORTHALIDONE TABLET 50; 25 MG/1; MG/1
TABLET ORAL
Qty: 45 TABLET | Refills: 1 | Status: SHIPPED | OUTPATIENT
Start: 2021-09-29 | End: 2023-10-06

## 2022-06-02 DIAGNOSIS — I10 ESSENTIAL HYPERTENSION: Primary | ICD-10-CM

## 2022-06-03 DIAGNOSIS — I10 ESSENTIAL HYPERTENSION: Primary | ICD-10-CM

## 2022-06-03 NOTE — LETTER
[address] [address]  Phone: [phone]  Fax: [phone]        June 9, 2022      Iftikhar DOVE                                                                                                                                [address] [address] [place] [address]            Dear Mr. DOVE,    We are concerned about your health care.  We recently provided you with a medication refill.  Many medications require routine follow-up with your Doctor.      At this time we ask that: You schedule an appointment for your annual physical. Please call the clinic at [phone] option 1 to schedule.     Your prescription:(Chlorthalidone) Has been refilled for 1 month so you may have time for the above noted follow-up.      Thank you,      benji Cox MD  Care Team

## 2022-06-06 NOTE — TELEPHONE ENCOUNTER
Pending Prescriptions:                       Disp   Refills    atenolol (TENORMIN) 50 MG tablet [Pharmacy*45 tab*1        Sig: TAKE ONE-HALF TABLET BY MOUTH ONCE DAILY    Routing refill request to provider for review/approval because:  Drug not active on patient's medication list  Patient needs to be seen because it has been more than 1 year since last office visit.  BP failed RN protocol    Sending to scheduling for yearly office visit due    Thelma Portillo RN

## 2022-06-06 NOTE — TELEPHONE ENCOUNTER
Patient should have been out of medications.  Please call to find out why he stopped.  Stephanie Luna, CNP

## 2022-06-07 RX ORDER — CHLORTHALIDONE 25 MG/1
TABLET ORAL
Qty: 45 TABLET | Refills: 1 | Status: SHIPPED | OUTPATIENT
Start: 2022-06-07 | End: 2023-01-19

## 2022-06-07 NOTE — TELEPHONE ENCOUNTER
Pending Prescriptions:                       Disp   Refills    chlorthalidone (HYGROTON) 25 MG tablet [Ph*45 tab*1        Sig: TAKE ONE-HALF TABLET BY MOUTH ONCE DAILY    Routing refill request to provider for review/approval because:  Drug not active on patient's medication list  Labs not current:  CR, Potassium  Patient needs to be seen because it has been more than 1 year since last office visit.  BP failed RN protocol    Sending to scheduling for yearly office visit due    Thelma Portillo RN

## 2022-06-08 NOTE — TELEPHONE ENCOUNTER
Spoke with Chanell BLAND on file and she states that patient has not stopped taking any of this medication and that he is due for a refill. This has been something that is being filled with [facility].     Brooke Rodriguez MA

## 2022-06-09 RX ORDER — ATENOLOL 50 MG/1
TABLET ORAL
Qty: 45 TABLET | Refills: 1 | Status: SHIPPED | OUTPATIENT
Start: 2022-06-09 | End: 2023-01-19

## 2023-01-17 DIAGNOSIS — I10 ESSENTIAL HYPERTENSION: ICD-10-CM

## 2023-01-17 NOTE — LETTER
[address] [address]  Phone: [phone]  Fax: [phone]        January 25, 2023      Iftikhar DOVE                                                                                                                                [address] [address] [place] [address]            Dear Mr. DOVE,    We are concerned about your health care.  We recently provided you with a medication refill.  Many medications require routine follow-up with your Doctor.      At this time we ask that: You schedule a routine office visit with your physician to follow your medications.     Your prescription: Has been refilled for 1 time so you may have time for the above noted follow-up.      Thank you,      Blaise Cox MD  Care Team

## 2023-01-19 RX ORDER — ATENOLOL 50 MG/1
TABLET ORAL
Qty: 30 TABLET | Refills: 0 | Status: SHIPPED | OUTPATIENT
Start: 2023-01-19 | End: 2023-06-28

## 2023-01-19 RX ORDER — CHLORTHALIDONE 25 MG/1
TABLET ORAL
Qty: 30 TABLET | Refills: 0 | Status: SHIPPED | OUTPATIENT
Start: 2023-01-19 | End: 2023-07-11

## 2023-01-19 NOTE — TELEPHONE ENCOUNTER
Attempted to reach patient by phone. Unable to leave voicemail due to it not being set up. Will try again later.   Katharine Cosby MA 1/19/2023

## 2023-01-19 NOTE — TELEPHONE ENCOUNTER
30 tablets was given.  Lets follow-up before med run out.  He has not been seen for almost 2 years.  He needs a physical with general follow-up, 40 minutes.

## 2023-01-19 NOTE — TELEPHONE ENCOUNTER
"Requested Prescriptions   Pending Prescriptions Disp Refills    atenolol (TENORMIN) 50 MG tablet [Pharmacy Med Name: ATENOLOL 50MG TABS] 45 tablet 1     Sig: TAKE ONE-HALF TABLET BY MOUTH EVERY DAY       Beta-Blockers Protocol Failed - 1/17/2023  8:45 AM        Failed - Blood pressure under 140/90 in past 12 months     BP Readings from Last 3 Encounters:   02/24/21 124/76   01/20/21 (!) 180/100   08/06/19 138/86                 Failed - Recent (12 mo) or future (30 days) visit within the authorizing provider's specialty     Patient has had an office visit with the authorizing provider or a provider within the authorizing providers department within the previous 12 mos or has a future within next 30 days. See \"Patient Info\" tab in inbasket, or \"Choose Columns\" in Meds & Orders section of the refill encounter.              Passed - Patient is age 6 or older        Passed - Medication is active on med list          chlorthalidone (HYGROTON) 25 MG tablet [Pharmacy Med Name: CHLORTHALIDONE 25MG TABS] 45 tablet 1     Sig: TAKE ONE-HALF TABLET BY MOUTH ONCE DAILY       Diuretics (Including Combos) Protocol Failed - 1/17/2023  8:45 AM        Failed - Blood pressure under 140/90 in past 12 months     BP Readings from Last 3 Encounters:   02/24/21 124/76   01/20/21 (!) 180/100   08/06/19 138/86                 Failed - Recent (12 mo) or future (30 days) visit within the authorizing provider's specialty     Patient has had an office visit with the authorizing provider or a provider within the authorizing providers department within the previous 12 mos or has a future within next 30 days. See \"Patient Info\" tab in inbasket, or \"Choose Columns\" in Meds & Orders section of the refill encounter.              Failed - Normal serum creatinine on file in past 12 months     Recent Labs   Lab Test 01/20/21  1231   CR 0.80              Failed - Normal serum potassium on file in past 12 months     Recent Labs   Lab Test 01/20/21  1231 "   POTASSIUM 4.0                    Failed - Normal serum sodium on file in past 12 months     Recent Labs   Lab Test 01/20/21  1231                 Passed - Medication is active on med list        Passed - Patient is age 18 or older

## 2023-01-25 NOTE — TELEPHONE ENCOUNTER
Unable to reach patient by phone, letter sent to patient to schedule.     Closing encounter.   Brooke Rodriguez MA

## 2023-04-01 DIAGNOSIS — I10 ESSENTIAL HYPERTENSION: ICD-10-CM

## 2023-04-01 NOTE — LETTER
April 6, 2023      Iftikhar DOVE  [address] [address] [place] [address]        Dear Iftikhar,     We are concerned about your health care.  We recently provided you with a medication refill.  Many medications require routine follow-up with your Doctor.      At this time we ask that: You schedule an appointment for your annual physical, medication and labs.  Call the clinic at [phone] Option 1 to schedule.     Your prescription:  Refill has been denied, until the above appointment has been completed.       Thank you,      Blaise Chance Mai, MD

## 2023-04-04 RX ORDER — CHLORTHALIDONE 25 MG/1
TABLET ORAL
Qty: 30 TABLET | Refills: 0 | OUTPATIENT
Start: 2023-04-04

## 2023-04-04 RX ORDER — ATENOLOL 50 MG/1
TABLET ORAL
Qty: 30 TABLET | Refills: 0 | OUTPATIENT
Start: 2023-04-04

## 2023-04-04 NOTE — TELEPHONE ENCOUNTER
Has not been seen nor had any lab done in the last 2 years.  Not sure if he has been seeing somebody else for this.  Otherwise please have him follow-up.  Thank you

## 2023-04-04 NOTE — TELEPHONE ENCOUNTER
Pending Prescriptions:                       Disp   Refills    atenolol (TENORMIN) 50 MG tablet [Pharmacy*30 tab*0        Sig: TAKE ONE-HALF TABLET BY MOUTH EVERY DAY    chlorthalidone (HYGROTON) 25 MG tablet [Ph*30 tab*0        Sig: TAKE ONE-HALF TABLET BY MOUTH EVERY DAY      Routing refill request to provider for review/approval because:  Arelis given x1 and patient did not follow up, please advise    Radha Solorzano RN on 4/4/2023 at 1:53 PM

## 2023-04-05 NOTE — TELEPHONE ENCOUNTER
Called patient no answer and no voicemail available on cell phone.  Home phone has been disconnected. Try again to reach patient on cell phone.

## 2023-04-06 NOTE — TELEPHONE ENCOUNTER
Unable to reach patient by phone, letter sent to schedule.     Closing encounter.   Brooke Rodriguez MA

## 2023-06-27 DIAGNOSIS — I10 ESSENTIAL HYPERTENSION: ICD-10-CM

## 2023-06-27 NOTE — TELEPHONE ENCOUNTER
Medication Question or Refill    Contacts       Type Contact Phone/Fax    06/27/2023 02:18 PM CDT Phone (Incoming) jaky stanley (Emergency Contact) [phone]          What medication are you calling about (include dose and sig)?: Lisinoprol 50 mg and Atentenol 50 mg    Preferred Pharmacy:     [address] [facility]   [facility] [address]  Phone: [phone] Fax: [phone]      Controlled Substance Agreement on file:   CSA -- Patient Level:    CSA: None found at the patient level.       Who prescribed the medication?: Blaise Cox    Do you need a refill? Yes    When did you use the medication last? Three months ago     Patient offered an appointment? Yes, wanted to see if someone would call before scheduling as appts are booked so far out.    Do you have any questions or concerns?  no      Okay to leave a detailed message?: Yes at Other phone number:  [phone]- [place]

## 2023-06-28 NOTE — TELEPHONE ENCOUNTER
I have not seen him for more than 2 years.  I am not sure what medication he is taking right now for his high blood pressure.  Please find out what it is and I am okay to refill for 1 month supply, have him come in in July rather than August as scheduled.  Recommend a physical with general follow-up, 40 minutes.  Thank you.

## 2023-06-28 NOTE — TELEPHONE ENCOUNTER
Consent to communicate on file for hermelindo- Burak.    Mail box is not set up.    Lisinopril is not on file.    Atenolol last filled 1/2023 and given message to get an appointment.    Please advise.    Radha Solorzano RN on 6/28/2023 at 10:54 AM

## 2023-06-29 NOTE — TELEPHONE ENCOUNTER
Attempted to reach patient by phone. Unable to leave voicemail due to it not being set up. Will try again later.

## 2023-07-05 NOTE — TELEPHONE ENCOUNTER
2nd attempt Attempted to reach patient by phone. Unable to leave voicemail due to it not being set up. Will try again later.

## 2023-07-06 NOTE — TELEPHONE ENCOUNTER
2nd attempt  Attempted to reach patient by phone. Unable to leave voicemail due to it not being set up. Will try again later.   Katharine Cosby MA 7/6/2023

## 2023-07-11 RX ORDER — ATENOLOL 50 MG/1
25 TABLET ORAL DAILY
Qty: 30 TABLET | Refills: 0 | Status: SHIPPED | OUTPATIENT
Start: 2023-07-11 | End: 2023-09-13

## 2023-07-11 RX ORDER — CHLORTHALIDONE 25 MG/1
12.5 TABLET ORAL DAILY
Qty: 30 TABLET | Refills: 0 | Status: SHIPPED | OUTPATIENT
Start: 2023-07-11 | End: 2023-09-13

## 2023-07-11 NOTE — TELEPHONE ENCOUNTER
Called the kassie line that the first encounter told us to call The patient left the office before the visit was finished. A message with her to anjelica us back and ask for me.     Katharine Cosby MA 7/11/2023

## 2023-09-13 DIAGNOSIS — I10 ESSENTIAL HYPERTENSION: ICD-10-CM

## 2023-09-13 RX ORDER — CHLORTHALIDONE 25 MG/1
12.5 TABLET ORAL DAILY
Qty: 30 TABLET | Refills: 0 | Status: SHIPPED | OUTPATIENT
Start: 2023-09-13 | End: 2023-10-28

## 2023-09-13 RX ORDER — ATENOLOL 50 MG/1
25 TABLET ORAL DAILY
Qty: 30 TABLET | Refills: 0 | Status: SHIPPED | OUTPATIENT
Start: 2023-09-13 | End: 2023-10-06

## 2023-10-06 ENCOUNTER — OFFICE VISIT (OUTPATIENT)
Dept: FAMILY MEDICINE | Facility: CLINIC | Age: 63
End: 2023-10-06
Payer: COMMERCIAL

## 2023-10-06 ENCOUNTER — HOSPITAL ENCOUNTER (OUTPATIENT)
Dept: GENERAL RADIOLOGY | Facility: CLINIC | Age: 63
Discharge: HOME | End: 2023-10-06
Attending: FAMILY MEDICINE
Payer: COMMERCIAL

## 2023-10-06 ENCOUNTER — LAB (OUTPATIENT)
Dept: FAMILY MEDICINE | Facility: CLINIC | Age: 63
End: 2023-10-06

## 2023-10-06 VITALS
HEIGHT: 69 IN | WEIGHT: 168.8 LBS | OXYGEN SATURATION: 95 % | SYSTOLIC BLOOD PRESSURE: 128 MMHG | HEART RATE: 75 BPM | TEMPERATURE: 98.1 F | DIASTOLIC BLOOD PRESSURE: 82 MMHG | RESPIRATION RATE: 16 BRPM | BODY MASS INDEX: 25 KG/M2

## 2023-10-06 DIAGNOSIS — I10 ESSENTIAL HYPERTENSION: ICD-10-CM

## 2023-10-06 DIAGNOSIS — G89.29 CHRONIC BILATERAL LOW BACK PAIN WITHOUT SCIATICA: ICD-10-CM

## 2023-10-06 DIAGNOSIS — Z00.00 ROUTINE GENERAL MEDICAL EXAMINATION AT A HEALTH CARE FACILITY: Primary | ICD-10-CM

## 2023-10-06 DIAGNOSIS — R97.20 ELEVATED PROSTATE SPECIFIC ANTIGEN (PSA): ICD-10-CM

## 2023-10-06 DIAGNOSIS — N40.1 BENIGN PROSTATIC HYPERPLASIA WITH NOCTURIA: ICD-10-CM

## 2023-10-06 DIAGNOSIS — M25.551 HIP PAIN, RIGHT: ICD-10-CM

## 2023-10-06 DIAGNOSIS — M54.50 CHRONIC BILATERAL LOW BACK PAIN WITHOUT SCIATICA: ICD-10-CM

## 2023-10-06 DIAGNOSIS — G89.29 CHRONIC PAIN OF RIGHT KNEE: ICD-10-CM

## 2023-10-06 DIAGNOSIS — F10.21 ALCOHOL DEPENDENCE IN REMISSION (H): ICD-10-CM

## 2023-10-06 DIAGNOSIS — M25.561 CHRONIC PAIN OF RIGHT KNEE: ICD-10-CM

## 2023-10-06 DIAGNOSIS — Z12.11 COLON CANCER SCREENING: ICD-10-CM

## 2023-10-06 DIAGNOSIS — F17.200 TOBACCO USE DISORDER: ICD-10-CM

## 2023-10-06 DIAGNOSIS — Z28.21 VACCINATION REFUSED BY PATIENT: ICD-10-CM

## 2023-10-06 DIAGNOSIS — E55.9 VITAMIN D DEFICIENCY: ICD-10-CM

## 2023-10-06 DIAGNOSIS — R35.1 BENIGN PROSTATIC HYPERPLASIA WITH NOCTURIA: ICD-10-CM

## 2023-10-06 PROBLEM — F10.20 ALCOHOL DEPENDENCE (H): Status: RESOLVED | Noted: 2021-02-24 | Resolved: 2023-10-06

## 2023-10-06 PROCEDURE — 73562 X-RAY EXAM OF KNEE 3: CPT | Mod: RT

## 2023-10-06 PROCEDURE — 99214 OFFICE O/P EST MOD 30 MIN: CPT | Mod: 25 | Performed by: FAMILY MEDICINE

## 2023-10-06 PROCEDURE — 91320 SARSCV2 VAC 30MCG TRS-SUC IM: CPT | Performed by: FAMILY MEDICINE

## 2023-10-06 PROCEDURE — 99397 PER PM REEVAL EST PAT 65+ YR: CPT | Mod: 25 | Performed by: FAMILY MEDICINE

## 2023-10-06 PROCEDURE — 73502 X-RAY EXAM HIP UNI 2-3 VIEWS: CPT

## 2023-10-06 PROCEDURE — 90480 ADMN SARSCOV2 VAC 1/ONLY CMP: CPT | Performed by: FAMILY MEDICINE

## 2023-10-06 RX ORDER — ATENOLOL 50 MG/1
50 TABLET ORAL DAILY
Qty: 90 TABLET | Refills: 1 | Status: SHIPPED | OUTPATIENT
Start: 2023-10-06 | End: 2024-04-30

## 2023-10-06 RX ORDER — TAMSULOSIN HYDROCHLORIDE 0.4 MG/1
0.4 CAPSULE ORAL DAILY
Qty: 90 CAPSULE | Refills: 0 | Status: SHIPPED | OUTPATIENT
Start: 2023-10-06 | End: 2024-01-22

## 2023-10-06 RX ORDER — MELOXICAM 7.5 MG/1
7.5 TABLET ORAL DAILY
Qty: 30 TABLET | Refills: 4 | Status: SHIPPED | OUTPATIENT
Start: 2023-10-06 | End: 2024-04-30

## 2023-10-06 ASSESSMENT — ENCOUNTER SYMPTOMS
FREQUENCY: 1
COUGH: 1
ARTHRALGIAS: 1
DIARRHEA: 1

## 2023-10-06 ASSESSMENT — ACTIVITIES OF DAILY LIVING (ADL)
CURRENT_FUNCTION: SHOPPING REQUIRES ASSISTANCE
CURRENT_FUNCTION: TRANSPORTATION REQUIRES ASSISTANCE

## 2023-10-06 ASSESSMENT — PAIN SCALES - GENERAL: PAINLEVEL: EXTREME PAIN (8)

## 2023-10-06 NOTE — PROGRESS NOTES
SUBJECTIVE:   CC: Iftikhar is an 63 year old who presents for preventative health visit.         10/6/2023     1:02 PM   Additional Questions   Roomed by Chrissy GONCALVES    Today's PHQ-2 Score:       10/6/2023    12:54 PM   PHQ-2 ( 1999 [employer])   Q1: Little interest or pleasure in doing things 2   Q2: Feeling down, depressed or hopeless 0   PHQ-2 Score 2   Q1: Little interest or pleasure in doing things More than half the days   Q2: Feeling down, depressed or hopeless Not at all   PHQ-2 Score 2       Iftikhar is here today for physical and general follow-up.     1.  Follow-up on high blood pressure.  Been taking the atenolol and chlorthalidone as prescribed with no side effect.  Not checking his blood pressure at home.  No headache or dizziness.  No chest pain or shortness of breath.  No leg swelling, orthopnea or dyspnea.  Denies of excessive salt intake.  Not exercising.     2.  Also chronic low back pain for which he has had for years.  Constant achy pain, starts in the low back that radiates to the left hip.  Has gotten worse slowly and is worse with twisting his back or weight bearing activities.  Also been having a lot of right hip and knee pain that prevented him from standing for more than 5 minutes or walking for a couple block.  Prolonged sitting also make it worse.  The same kind of pain that he has had.  No weakness.  No unusual activity or trauma.  Had a lumbar spine MRI about 3 years ago but not really sure what it showed.  Diclofenac has not helped.  The pain is bothering him.  The same kind of pain that he has had.  Back feels stiff, especially in the morning.  No numbness or tingling sensation.  No weakness.  No fever or chills.  No recent history of trauma or unusual activities.     3.  He has a history of alcoholism but has not been drinking heavily for couple years - drinks rarely and socially.  He has no concern about it.  No craving.     4. Otherwise, he is doing ok. No major medical care or  procedure done since the last physical about 2 years year ago. No headache, dizziness, or acute visual change. No runny nose, nasal congestion, coughing, fever or chill. No CP/SOB. No N/V/D/C. Has a hard time to initiate urination with urgency at times. Get up to 3-4 times a night for urination.  Known to have elevated PSA but never been evaluated by choice.  No abdominal pain.  No leg swelling, orthopnea or dyspnea.  Not exercising due to back, hip and knee pain.  Denied of drug use.  Smoking about 1/2 ppd for about 50 years, not ready to quit. No problem with sleeping.  Feels safe at home - lives with his sister.  No weight change and denied of being depressed. No other concern today        Social History     Tobacco Use    Smoking status: Every Day     Packs/day: 0.50     Years: 50.00     Additional pack years: 0.00     Total pack years: 25.00     Types: Cigarettes    Smokeless tobacco: Never   Substance Use Topics    Alcohol use: Yes     Alcohol/week: 35.0 standard drinks of alcohol     Types: 42 Cans of beer per week     Comment: used  heavily - 3 beers/drink once a week since 1/19/2019             10/6/2023    12:53 PM   Alcohol Use   Prescreen: >3 drinks/day or >7 drinks/week? No       Last PSA:   PSA   Date Value Ref Range Status   02/24/2021 4.76 (H) 0 - 4 ug/L Final     Comment:     Assay Method:  Chemiluminescence using Siemens Vista analyzer       Reviewed orders with patient. Reviewed health maintenance and updated orders accordingly - Yes  BP Readings from Last 3 Encounters:   10/06/23 128/82   02/24/21 124/76   01/20/21 (!) 180/100    Wt Readings from Last 3 Encounters:   10/06/23 76.6 kg (168 lb 12.8 oz)   02/24/21 78.1 kg (172 lb 3.2 oz)   01/20/21 77.6 kg (171 lb)                  Patient Active Problem List   Diagnosis    Chronic bilateral low back pain without sciatica    Essential hypertension    Elevated prostate specific antigen (PSA)    Vitamin D deficiency    Umbilical hernia without  obstruction and without gangrene    Tobacco use disorder    Alcohol dependence in remission (H)     Past Surgical History:   Procedure Laterality Date    NO HISTORY OF SURGERY         Social History     Tobacco Use    Smoking status: Every Day     Packs/day: 0.50     Years: 50.00     Additional pack years: 0.00     Total pack years: 25.00     Types: Cigarettes    Smokeless tobacco: Never   Substance Use Topics    Alcohol use: Yes     Alcohol/week: 35.0 standard drinks of alcohol     Types: 42 Cans of beer per week     Comment: used  heavily - 3 beers/drink once a week since 2019     Family History   Problem Relation Age of Onset    Cancer Mother         colon -  75    Cancer Father         brain tumor    Cancer Sister         female cancer    Unknown/Adopted Sister     Unknown/Adopted Sister     Unknown/Adopted Sister     Unknown/Adopted Maternal Grandmother     Unknown/Adopted Maternal Grandfather     Unknown/Adopted Paternal Grandmother     Unknown/Adopted Paternal Grandfather     No Known Problems Son     No Known Problems Son          Current Outpatient Medications   Medication Sig Dispense Refill    atenolol (TENORMIN) 50 MG tablet Take 1 tablet (50 mg) by mouth daily Please stop the chlorthalidone 90 tablet 1    meloxicam (MOBIC) 7.5 MG tablet Take 1 tablet (7.5 mg) by mouth daily 30 tablet 4    tamsulosin (FLOMAX) 0.4 MG capsule Take 1 capsule (0.4 mg) by mouth daily 90 capsule 0    vitamin D3 (CHOLECALCIFEROL) 50 mcg (2000 units) tablet Take 1 tablet (50 mcg) by mouth daily 100 tablet 3     Allergies   Allergen Reactions    Pcn [Penicillins] Unknown     Recent Labs   Lab Test 21  1231 19  0921   * 123*   HDL 54 48   TRIG 111 134   ALT 16 19   CR 0.80 0.87   GFRESTIMATED >90 >90   GFRESTBLACK >90 >90   POTASSIUM 4.0 4.5   TSH 1.40  --         Reviewed and updated as needed this visit by clinical staff   Tobacco  Allergies  Meds  Problems  Med Hx  Surg Hx  Fam Hx  Soc   Hx     "    Reviewed and updated as needed this visit by Provider   Tobacco  Allergies  Meds  Problems  Med Hx  Surg Hx  Fam Hx  Soc   Hx           Review of Systems     Please see subjective otherwise the complete review of system was negative     OBJECTIVE:   /82 (Cuff Size: Adult Large)   Pulse 75   Temp 98.1  F (36.7  C)   Resp 16   Ht 1.763 m (5' 9.4\")   Wt 76.6 kg (168 lb 12.8 oz)   SpO2 95%   BMI 24.64 kg/m      Physical Exam  GENERAL: alert and no distress, speaking full sentences  EYES: Eyes grossly normal to inspection, PERRL and conjunctivae and sclerae normal.  No nystagmus.  All 4 visual fields are intact  HENT: Ear canals and TM's normal.  Nares are non-congested. Oropharynx is pink and moist. No tender with palpation to the sinuses.  NECK: no adenopathy, supple, no lymphadenopathy or thyromegaly.  No tender with palpation to the cervical spine or its paraspinous muscle.  No JV distention or carotid bruits  RESP: lungs clear to auscultation - no rales, rhonchi or wheezes.  Good respiratory effort throughout  CV: regular rate and rhythm, no murmur.  ABDOMEN: soft, nondistended, nontender, no palpable masses or organomegaly with normal bowel sounds.  MS: no gross musculoskeletal defects noted, no edema.  Walk with no limping, unstable gait with positional change from sitting to standing; had a hard time to get up and down the exam table.  All 4 extremities are equally in strength with general weakness.  Limited range of motion at all major joints.  Tender with palpation to the lumbar spine, right hip and right knee.  Knee exam without effusion or redness.  No instability.  Negative straight leg maneuver.  Back is straight, no lordosis or scoliosis.  SKIN: no suspicious lesions or rashes  NEURO: Normal tone, mentation intact and speech normal.  Cranial nerves II through XII intact, DTR +2 throughout, no focal neurological deficit.  PSYCH: mentation appears normal, affect normal/bright.  " Thoughts intact, no hallucination.  No suicidal or homicidal ideation.  LYMPH: no cervical, supraclavicular or axillary adenopathy.   (male): Offered and recommended but he declined.      Diagnostic Test Results:  Labs reviewed in Epic  Results for orders placed or performed during the hospital encounter of 10/06/23   XR Pelvis w Hip Right 1 View     Status: None    Narrative    Examination:  XR PELVIS AND HIP RIGHT 1 VIEW    Date:  10/6/2023 2:38 PM     Clinical Information: Right hip pain.    Comparison: none.      Impression    Impression:    1.  Normal joint alignment. No fracture or bone lesion. Maintained  joint spacing in both hips. Mild-moderate degenerative arthritic  spurring in both hips, left greater than right. Moderate-advanced  hypertrophic changes in the lower lumbar spine.    ROMAN LLANES MD         SYSTEM ID:  [id number]   Results for orders placed or performed in visit on 10/06/23   COLOGUARD(EXACT SCIENCES)     Status: None   Result Value Ref Range    COLOGUARD-ABSTRACT Sample Could Not Be Processed 9 N/A   Results for orders placed or performed during the hospital encounter of 10/06/23   XR Knee Right 3 Views     Status: None    Narrative    Examination:  XR KNEE RIGHT 3 VIEWS    Date:  10/6/2023 2:36 PM     Clinical Information: Chronic right knee pain.    Comparison: none.      Impression    Impression:    1.  Normal right knee. Normal joint alignment and spacing. No fracture  or joint effusion.    ROMAN LLANES MD         SYSTEM ID:  [id number]       ASSESSMENT/PLAN:   (Z00.00) Routine general medical examination at a health care facility  (primary encounter diagnosis)  Comment: Iftikhar has several chronic medical conditions that are overall stable.  He is overall doing well today.  He declined all vaccinations, please see below for further details. Discussed about safety issue, healthy diet, exercising or staying active as possible, fall prevention, depression prevention  and good sleeping hygiene.  He is no longer drinking alcohol.  Emphasized on healthy diet with adequate fluid intake and resting. Feels safe, lives with his sister.  Offered home health aide but he declined.  Follow in 1 year for physical, earlier as needed.  Labs as ordered and results reviewed.       Discussed with him about options for colon cancer screening which include colonoscopy, Cologuard or FIT test.  Pros and cons of each option discussed.  He preferred Cologuard.  He was informed that that positive will require further evaluation, including colonoscopy.  He was also informed that normal Cologuard is recommended to be repeated every 3 years.        Discussed with him about the pros and cons of prostate screening cancer with PSA.  Also educated about the potential false positive which may require unnecessary work-up.  He was informed of negative/normal PSA leve does not rule out prostate cancer completely although it is very unlikely.  He understands and is willing to take the risk.  His PSA level has been elevated the last several years, please see below for further details.    Plan: Lipid panel reflex to direct LDL Non-fasting,         Comprehensive metabolic panel (BMP + Alb, Alk         Phos, ALT, AST, Total. Bili, TP), PSA, screen,         Vitamin D Deficiency, CBC with platelets            (I10) Essential hypertension  Comment: Controlled with the chlorthalidone and atenolol.  No history of high cholesterol, DM, CAD, CAV or PAD.  The goal is for his blood pressure is  140s/90s; avoiding lower blood pressure due to high risk for falling.  He typically does not like to come to the clinic for follow-up nor compliant with lab work.  We will avoid ACE or diuretic.  Stop the chlorthalidone and continue with atenolol.  Encouraged to avoid high salt intake.    Plan: atenolol (TENORMIN) 50 MG tablet            (M25.561,  G89.29) Chronic pain of right knee  Comment: Likely due to arthritis.  No sign of  infection.  Discussed with him about the nature of the condition and its treatment options.  Encouraged to continue with his normal activity as tolerated.  Will have him try the Mobic for pain.  Encouraged to continue with Tylenol as needed as well.  X-ray today which was normal.  Recommended to come in for steroid injection if the pain becomes unbearable.  Discussed about PT but he declined.    Plan: XR Knee Right 3 Views, meloxicam (MOBIC) 7.5 MG        tablet            (M25.551) Hip pain, right  Comment: Likely due to arthritis.  No sign of infection.  Discussed with him about the nature of the condition and its treatment options.  Encouraged to continue with his normal activity as tolerated.  Will have him try the Mobic for pain.  Encouraged to continue with Tylenol as needed as well.  X-ray today which was normal.  Discussed about PT but he declined.  Discussed about seeing Ortho clinic for potential injection but he also declined.    Plan: meloxicam (MOBIC) 7.5 MG tablet, CANCELED: XR         Hip Right 2-3 Views            (M54.50,  G89.29) Chronic bilateral low back pain without sciatica  Comment: Reviewed the 2019 lumbar spine MRI result with patient which indicated of multilevel degenerative changes of the lumbar spine with the most pronounced finding at L4-L5 and L5-S1 with bulging disc and annular fissure; severe right and moderate to severe left facet arthropathy as well as mild to moderate left neuroforaminal stenosis.  Discussed with him about the significance of the finding.  Exam today showed no focal neurological deficit.  Clinical presentation did not suggest equina syndrome or infection.  Declined physical therapy, did not tolerate muscle relaxant.  Will also have him try the Mobic, side effect discussed.  Symptoms and need to be seen or call in discussed as well.      (F10.21) Alcohol dependence in remission (H)  Comment: He has a long history of alcoholism but has been in remission in the  several couple years.  No longer drinking, no craving.  Encouraged him to keep the good work with sobering.       (E55.9) Vitamin D deficiency  Comment: Food with high vitamin D sources discussed and recommended.  Continue with the vitamin D supplement.   Plan: Vitamin D Deficiency            (R97.20) Elevated prostate specific antigen (PSA)  (N40.1,  R35.1) Benign prostatic hyperplasia with nocturia  Comment: His prostate enzyme was elevated 4 years ago and again two years ago - about the same.  He also displayed symptoms of BPH.  I discussed with him about the significance of the finding and informed him that it is most likely due to enlarged prostate.  However, he was informed that it is very important to evaluate for prostate cancer.  He was referred to urology but he never follow-up with it.  Recheck the PSA level today and encouraged him to get it done as soon as possible.  Will continue with the Flomax for nocturia.  Offered and recommended urology referral today but he declined.    Plan: PSA, screen; Tamsulosin (FLOMAX) 0.4 MG capsule            (F17.200) Tobacco use disorder  Comment: Iftikhar has been smoking for years.  Discussed with him about the long and short term consequences of tobacco smoking.  He is was strongly encouraged to stop smoking.  Discussed with him about different options for smoking cessation aids.  He is not ready to quit smoking at this time.  Encourage to let me know when he is ready to quit.  In a mean time, I encourage him to reduce to amount of cigarrets smoke as much as possible.  All of his questions were answered.     Also discussed about lung cancer screening but he declined.      (Z12.11) Colon cancer screening  Comment: Please see #1 above for further details.  Plan: CINTIA(EXACT SCIENCES)            (Z28.21) Vaccination refused by patient  Comment: He was offered and recommended pneumococcal, shingle, Tdap, RSV and influenza vaccination today but he declined.  Risks and  benefits discussed and he is willing to take the risks.      Patient has been advised of split billing requirements and indicates understanding: Yes      COUNSELING:   Reviewed preventive health counseling, as reflected in patient instructions       Regular exercise       Healthy diet/nutrition       Vision screening       Hearing screening       Immunizations  Declined: Covid-19, Influenza, Pneumococcal, TDAP, and Zoster due to Conscientious objector             Alcohol Use        Colorectal cancer screening       Prostate cancer screening       Osteoporosis prevention/bone health       Advance Care Planning        He reports that he has been smoking. He has a 25.00 pack-year smoking history. He has never used smokeless tobacco.  Nicotine/Tobacco Cessation Plan:   Information offered: Patient not interested at this time            Blaise Chance Mai, MD  [facility]

## 2023-10-06 NOTE — PATIENT INSTRUCTIONS
Preventive Health Recommendations:     See your health care provider every year to  Review health changes.   Discuss preventive care.    Review your medicines if your doctor has prescribed any.    Talk with your health care provider about whether you should have a test to screen for prostate cancer (PSA).  Every 3 years, have a diabetes test (fasting glucose). If you are at risk for diabetes, you should have this test more often.  Every 5 years, have a cholesterol test. Have this test more often if you are at risk for high cholesterol or heart disease.   Every 10 years, have a colonoscopy. Or, have a yearly FIT test (stool test). These exams will check for colon cancer.  Talk to with your health care provider about screening for Abdominal Aortic Aneurysm if you have a family history of AAA or have a history of smoking.    Shots:   Get a flu shot each year.   Get a tetanus shot every 10 years.   Talk to your doctor about your pneumonia vaccines. There are now two you should receive - Pneumovax (PPSV 23) and Prevnar (PCV 13).   Talk to your pharmacist about a shingles vaccine.   Talk to your doctor about the hepatitis B vaccine.  Nutrition:   Eat at least 5 servings of fruits and vegetables each day.   Eat whole-grain bread, whole-wheat pasta and brown rice instead of white grains and rice.   Get adequate Calcium and Vitamin D.   Lifestyle  Exercise for at least 150 minutes a week (30 minutes a day, 5 days a week). This will help you control your weight and prevent disease.   Limit alcohol to one drink per day.   No smoking.   Wear sunscreen to prevent skin cancer.  See your dentist every six months for an exam and cleaning.  See your eye doctor every 1 to 2 years to screen for conditions such as glaucoma, macular degeneration, cataracts, etc.    Personalized Prevention Plan  You are due for the preventive services outlined below.  Your care team is available to assist you in scheduling these services.  If you have  already completed any of these items, please share that information with your care team to update in your medical record.  Health Maintenance Due   Topic Date Due     ANNUAL REVIEW OF HM ORDERS  Never done     Pneumococcal Vaccine (1 - PCV) Never done     Colorectal Cancer Screening  Never done     Zoster (Shingles) Vaccine (1 of 2) Never done     LUNG CANCER SCREENING  Never done     Diptheria Tetanus Pertussis (DTAP/TDAP/TD) Vaccine (2 - Td or Tdap) 09/01/2021     AORTIC ANEURYSM SCREENING (SYSTEM ASSIGNED)  Never done     Flu Vaccine (1) Never done     COVID-19 Vaccine (2 - 2023-24 season) 09/01/2023

## 2023-10-06 NOTE — COMMUNITY RESOURCES LIST (ENGLISH)
10/06/2023   [facility]  N/A  For questions about this resource list or additional care needs, please contact your primary care clinic or care manager.  Phone: [phone]   Email: N/A   Address: [address] [place] [id number]   Hours: N/A        Financial Stability       Rent and mortgage payment assistance  1  [facility]) - Emergency Assistance Distance: 24.06 miles      In-Person   [address] [place] [address]  Language: English, Romanian  Hours: Mon 10:00 AM - 1:30 PM Appt. Only, Wed 10:00 AM - 1:30 PM Appt. Only, Thu 4:30 PM - 6:30 PM Appt. Only, Fri 10:00 AM - 1:30 PM Appt. Only  Fees: Free   Phone: (840) 510-2216 Email: info@[email or web address].[employer]ice Website: http://[email or web address].org          Food and Nutrition       Food pantry  2  [facility] Distance: 6.57 miles      Pickup   [address] [place] [address]  Language: English  Hours: Thu 12:00 PM - 4:00 PM  Fees: Free   Phone: (293) 991-8255 Email: cynthia@[email or web address].[facility] Website: [email or web address]/[facility]/     3  [facility] Distance: 11 miles      Pickup   [address] [place] [address]  Language: English  Hours: Mon 1:00 PM - 4:00 PM , Mon 6:00 PM - 8:00 PM , Thu 10:00 AM - 3:00 PM  Fees: Free   Phone: (987) 549-8064 Email: crosscenter@[employer] Website: http://www.[facility] Hospital[facility].org/     SNAP application assistance  4  [facility] - Main [facility] Distance: 24.87 miles      In-Person, Phone/Virtual   [address] [place] [address]  Language: English  Hours: Mon - Fri 6:00 AM - 6:30 PM  Fees: Free   Phone: (199) 270-9354 Email: kathya@[employer] Website: http://www.[employer].org     Soup kitchen or free meals  5  [place] [employer] Distance: 22.88 miles      [facility]   [address] [place] [address]  Language: English  Hours: Mon - Fri 11:30 AM - 12:45 PM  Fees: Free   Phone: (981) 916-5763  Email: drew@[facility].salvationarmy.org Website: [email or web address]/[facility]/          Transportation       Free or low-cost transportation  6  [employer] Distance: 23.33 miles      In-Person   [address] [place] [address]  Language: English  Hours: Mon - Fri 5:15 AM - 10:43 PM , Sat 7:45 AM - 7:12 PM , Sun 8:45 AM - 6:12 PM  Fees: Self Pay   Phone: (519) 176-6523 Email: info@[employer] Website: https://[employer].com/     Transportation to medical appointments  7  [facility] [employer] (CARE) Distance: 10.91 miles      In-Person   [address] [place], [place] [id number]  Language: English  Hours: Mon - Thu 8:00 AM - 3:00 PM  Fees: Self Pay   Phone: (878) 926-9040 Email: info@[employer] Website: https://Breakmoon.com/     8  [employer] Distance: 11.75 miles      In-Person   [address] [id number]  Language: English  Hours: Mon - Fri 4:00 AM - 6:00 PM  Fees: Insurance, Self Pay   Phone: (460) 267-1291 Email: odilon@[employer]          Important Numbers & Websites       Emergency Services   911  City Services   311  Poison Control   (566) 483-8688  Suicide Prevention Lifeline   (237) 239-2440 (TALK)  Child Abuse Hotline   (669) 195-8694 (4-A-Child)  Sexual Assault Hotline   (853) 397-4509 (HOPE)  National Runaway Safeline   (654) 618-5699 (RUNAWAY)  All-Options Talkline   (608) 222-8168  Substance Abuse Referral   (638) 795-9894 (HELP)

## 2023-10-06 NOTE — Clinical Note
Please remind patient that I strongly recommend him to come in for lab work done as soon as possible.  His medical opinion form filled out on his behalf.  He will need to stop by to sign the paperwork before it can be faxed to the county.

## 2023-10-06 NOTE — COMMUNITY RESOURCES LIST (ENGLISH)
10/06/2023   [facility]  N/A  For questions about this resource list or additional care needs, please contact your primary care clinic or care manager.  Phone: [phone]   Email: N/A   Address: [address] [place] [id number]   Hours: N/A        Financial Stability       Rent and mortgage payment assistance  1  [employer] ([employer]) - Emergency Assistance Distance: 24.06 miles      In-Person   [address] [place] [address]  Language: English, Upper sorbian  Hours: Mon 10:00 AM - 1:30 PM Appt. Only, Wed 10:00 AM - 1:30 PM Appt. Only, Thu 4:30 PM - 6:30 PM Appt. Only, Fri 10:00 AM - 1:30 PM Appt. Only  Fees: Free   Phone: (410) 805-8164 Email: info@[facility] Website: http://www.[facility].org          Food and Nutrition       Food pantry  2  [facility] Distance: 6.57 miles      Pickup   [address] [address]  Language: English  Hours: Thu 12:00 PM - 4:00 PM  Fees: Free   Phone: (116) 341-6222 Email: cynthia@[place] Website: https://www.[employer]Link.[employer]/[facility]/     3  [facility] Distance: 11 miles      Pickup   [address] [place] [address]  Language: English  Hours: Mon 1:00 PM - 4:00 PM , Mon 6:00 PM - 8:00 PM , Thu 10:00 AM - 3:00 PM  Fees: Free   Phone: (115) 698-6400 Email: crosscenter@[employer] Website: http://www.[facility].org/     SNAP application assistance  4  [facility] - Main Office Distance: 24.87 miles      In-Person, Phone/Virtual   [address] [place] [address]  Language: English  Hours: Mon - Fri 6:00 AM - 6:30 PM  Fees: Free   Phone: (605) 891-9630 Email: kathya@[employer].[facility] Website: http://www.[employer].org     Soup kitchen or free meals  5  [facility] Lunch Distance: 22.88 miles      [facility]   [address] [place] [address]  Language: English  Hours: Mon - Fri 11:30 AM - 12:45 PM  Fees: Free   Phone: (173) 492-6888  Email: drew@[facility].org Website: http://[facility].org/[facility]/          Transportation       Free or low-cost transportation  6  [employer] Distance: 23.33 miles      In-Person   [address] [place] [address]  Language: English  Hours: Mon - Fri 5:15 AM - 10:43 PM , Sat 7:45 AM - 7:12 PM , Sun 8:45 AM - 6:12 PM  Fees: Self Pay   Phone: (356) 162-3541 Email: info@[employer] Website: https://HomeTouch/     Transportation to medical appointments  7  [facility] [employer] (CARE) Distance: 10.91 miles      In-Person   [address] [place], [place] [id number]  Language: English  Hours: Mon - Thu 8:00 AM - 3:00 PM  Fees: Self Pay   Phone: (901) 763-2472 Email: info@[employer] Website: https://[employer]/     8  [employer] Distance: 11.75 miles      In-Person   [address] [id number]  Language: English  Hours: Mon - Fri 4:00 AM - 6:00 PM  Fees: Insurance, Self Pay   Phone: (161) 965-5523 Email: odilon@[employer]          Important Numbers & Websites       Emergency Services   911  City Services   311  Poison Control   (991) 338-7930  Suicide Prevention Lifeline   (503) 721-9965 (TALK)  Child Abuse Hotline   (882) 373-1270 (4-A-Child)  Sexual Assault Hotline   (472) 485-1060 (HOPE)  National Runaway Safeline   (589) 413-3681 (RUNAWAY)  All-Options Talkline   (708) 360-4054  Substance Abuse Referral   (879) 504-5645 (HELP)

## 2023-10-09 ENCOUNTER — TELEPHONE (OUTPATIENT)
Dept: FAMILY MEDICINE | Facility: CLINIC | Age: 63
End: 2023-10-09
Payer: COMMERCIAL

## 2023-10-09 NOTE — LETTER
October 10, 2023      Iftikhar DOVE  [address] [address] [place] [address]        Dear ,    We are writing to inform you of your test results.    Hip x-ray showed arthritis.  With his pain, I recommend either seeing orthopedics and/or get a physical therapy.  I think it is better for him to see an orthopedic for potential injection.  Let me know if his interested. Knee x-ray was normal. As discussed, I recommend him to follow-up for knee injection.     Thank you    Blaise Cox MD    Resulted Orders   XR Knee Right 3 Views    Narrative    Examination:  XR KNEE RIGHT 3 VIEWS    Date:  10/6/2023 2:36 PM     Clinical Information: Chronic right knee pain.    Comparison: none.      Impression    Impression:    1.  Normal right knee. Normal joint alignment and spacing. No fracture  or joint effusion.    ROMAN LLANES MD         SYSTEM ID:  [id number]   XR Pelvis w Hip Right 1 View    Narrative    Examination:  XR PELVIS AND HIP RIGHT 1 VIEW    Date:  10/6/2023 2:38 PM     Clinical Information: Right hip pain.    Comparison: none.      Impression    Impression:    1.  Normal joint alignment. No fracture or bone lesion. Maintained  joint spacing in both hips. Mild-moderate degenerative arthritic  spurring in both hips, left greater than right. Moderate-advanced  hypertrophic changes in the lower lumbar spine.    ROMAN LLANES MD         SYSTEM ID:  [id number]       If you have any questions or concerns, please call the clinic at the number listed above.

## 2023-10-10 NOTE — TELEPHONE ENCOUNTER
----- Message from Blaise Chance Mai, MD sent at 10/7/2023  4:23 PM CDT -----  Please let patient know that his hip x-ray showed arthritis.  With his pain, I recommend either seeing orthopedics and/or get a physical therapy.  I think it is better for him to see an orthopedic for potential injection.  Let me know if his interested.  
----- Message from Blaise Chance Mai, MD sent at 10/7/2023  4:24 PM CDT -----  Please let patient know that his knee x-ray was normal.  As discussed, I recommend him to follow-up for knee injection.  Thank you  
2nd attempt, unable to reach patient, by phone. Letter sent as well with results.   Closing encounter.   Brooke Rodriguez MA   
No answer no voice mail on cell#. Home # is not in service. Will try again.  
Neuro

## 2023-10-16 LAB — NONINV COLON CA DNA+OCC BLD SCRN STL QL: NORMAL

## 2023-10-17 ENCOUNTER — TELEPHONE (OUTPATIENT)
Dept: FAMILY MEDICINE | Facility: CLINIC | Age: 63
End: 2023-10-17
Payer: COMMERCIAL

## 2023-10-17 NOTE — LETTER
October 17, 2023      Anilgilberto UNDERWOOD PETTY  [address] [place] [address]        Dear ,    We are writing to inform you of your test results.    Colon cancer screening with a Cologuard was negative/normal.  He is cleared for 3 years.     Resulted Orders   COLOGUARD(EXACT SCIENCES)   Result Value Ref Range    COLOGUARD-ABSTRACT Sample Could Not Be Processed 9 N/A      Comment:      An empty collection kit was received in the laboratory. The patient will be contacted to initiate a new sample collection.       If you have any questions or concerns, please call the clinic at the number listed above.       Sincerely,

## 2023-10-17 NOTE — TELEPHONE ENCOUNTER
----- Message from Blaise Chance Mai, MD sent at 10/17/2023  7:28 AM CDT -----  Please let patient know that the colon cancer screening with a Cologuard was negative/normal.  He is cleared for 3 years.  Thank you

## 2023-10-30 ENCOUNTER — TELEPHONE (OUTPATIENT)
Dept: FAMILY MEDICINE | Facility: CLINIC | Age: 63
End: 2023-10-30
Payer: COMMERCIAL

## 2023-10-30 NOTE — CONFIDENTIAL NOTE
Blaise Cox MD  [facility]  Please remind patient that I strongly recommend him to come in for lab work done as soon as possible.    His medical opinion form filled out on his behalf.  He will need to stop by to sign the paperwork before it can be faxed to the county.

## 2023-12-07 ENCOUNTER — TELEPHONE (OUTPATIENT)
Dept: FAMILY MEDICINE | Facility: CLINIC | Age: 63
End: 2023-12-07

## 2023-12-07 ENCOUNTER — HOSPITAL ENCOUNTER (EMERGENCY)
Facility: CLINIC | Age: 63
Discharge: HOME | End: 2023-12-07
Attending: EMERGENCY MEDICINE | Admitting: EMERGENCY MEDICINE
Payer: COMMERCIAL

## 2023-12-07 VITALS
HEART RATE: 79 BPM | BODY MASS INDEX: 24.52 KG/M2 | SYSTOLIC BLOOD PRESSURE: 173 MMHG | DIASTOLIC BLOOD PRESSURE: 127 MMHG | TEMPERATURE: 98.7 F | WEIGHT: 168 LBS | OXYGEN SATURATION: 97 % | RESPIRATION RATE: 20 BRPM

## 2023-12-07 DIAGNOSIS — M54.16 LUMBAR RADICULOPATHY: ICD-10-CM

## 2023-12-07 PROCEDURE — 99284 EMERGENCY DEPT VISIT MOD MDM: CPT | Performed by: EMERGENCY MEDICINE

## 2023-12-07 PROCEDURE — 99283 EMERGENCY DEPT VISIT LOW MDM: CPT | Performed by: EMERGENCY MEDICINE

## 2023-12-07 RX ORDER — METHYLPREDNISOLONE 4 MG
TABLET, DOSE PACK ORAL
Qty: 21 TABLET | Refills: 0 | Status: SHIPPED | OUTPATIENT
Start: 2023-12-07 | End: 2023-12-22

## 2023-12-07 RX ORDER — TRAMADOL HYDROCHLORIDE 50 MG/1
50 TABLET ORAL EVERY 6 HOURS PRN
Qty: 20 TABLET | Refills: 0 | Status: SHIPPED | OUTPATIENT
Start: 2023-12-07 | End: 2023-12-10

## 2023-12-07 ASSESSMENT — ACTIVITIES OF DAILY LIVING (ADL): ADLS_ACUITY_SCORE: 35

## 2023-12-07 NOTE — ED PROVIDER NOTES
"  History     Chief Complaint   Patient presents with    Leg Pain     HPI  Iftikhar DOVE is a 63 year old male who presents to the ER secondary to back pain radiating down the right leg.  Symptoms have been around for a long time but they have flared up as of yesterday.  There is no trauma that is new.  He has previously been seen for this and had an MRI which showed degenerative changes most pronounced at L4-L5 and L5-S1.  That MRI was about 3 years ago.  Please see below.  He does not have any new numbness or incontinence.  The pain is made worse by straight leg raise.  No symptoms in the other leg.  The pain starts at the lumbar area more so on the right radiates across the right buttocks into the right groin and down the anterior aspect of the right leg all the way to the foot.  He was unable to sleep last night.  He normally does not like to use any narcotic pills.  He tried meloxicam previously without relief.    IMPRESSION: Multilevel degenerative changes of the lumbar spine, as  detailed in the body of the report. The findings are most pronounced  at L4-L5 and L5-S1.    Allergies:  Allergies   Allergen Reactions    Pcn [Penicillins] Unknown     Happened as child, states \"was in the hospital\"       Problem List:    Patient Active Problem List    Diagnosis Date Noted    Tobacco use disorder 01/22/2021     Priority: Medium    Alcohol dependence in remission (H) 01/22/2021     Priority: Medium    Elevated prostate specific antigen (PSA) 03/07/2019     Priority: Medium    Vitamin D deficiency 03/07/2019     Priority: Medium    Umbilical hernia without obstruction and without gangrene 03/07/2019     Priority: Medium    Chronic bilateral low back pain without sciatica 03/04/2019     Priority: Medium    Essential hypertension 03/04/2019     Priority: Medium        Past Medical History:    Past Medical History:   Diagnosis Date    Alcohol dependence (H) 02/24/2021    Hypertension        Past Surgical History:  "   Past Surgical History:   Procedure Laterality Date    NO HISTORY OF SURGERY         Family History:    Family History   Problem Relation Age of Onset    Cancer Mother         colon -  75    Cancer Father         brain tumor    Cancer Sister         female cancer    Unknown/Adopted Sister     Unknown/Adopted Sister     Unknown/Adopted Sister     Unknown/Adopted Maternal Grandmother     Unknown/Adopted Maternal Grandfather     Unknown/Adopted Paternal Grandmother     Unknown/Adopted Paternal Grandfather     No Known Problems Son     No Known Problems Son        Social History:  Marital Status:  Single [1]  Social History     Tobacco Use    Smoking status: Every Day     Packs/day: 0.50     Years: 50.00     Additional pack years: 0.00     Total pack years: 25.00     Types: Cigarettes    Smokeless tobacco: Never   Vaping Use    Vaping Use: Never used   Substance Use Topics    Alcohol use: Yes     Alcohol/week: 35.0 standard drinks of alcohol     Types: 42 Cans of beer per week     Comment: used  heavily - 3 beers/drink once a week since 2019    Drug use: Yes     Types: Marijuana     Comment: daily use        Medications:    methylPREDNISolone (MEDROL DOSEPAK) 4 MG tablet therapy pack  tiZANidine (ZANAFLEX) 4 MG tablet  traMADol (ULTRAM) 50 MG tablet  atenolol (TENORMIN) 50 MG tablet  meloxicam (MOBIC) 7.5 MG tablet  tamsulosin (FLOMAX) 0.4 MG capsule  vitamin D3 (CHOLECALCIFEROL) 50 mcg (2000 units) tablet          Review of Systems   All other systems reviewed and are negative.      Physical Exam   BP: (!) 199/97  Pulse: 81  Temp: 98.7  F (37.1  C)  Resp: 20  Weight: 76.2 kg (168 lb)  SpO2: 96 %      Physical Exam  Vitals and nursing note reviewed.   Constitutional:       General: He is not in acute distress.     Appearance: Normal appearance. He is well-developed. He is not diaphoretic.   HENT:      Head: Normocephalic and atraumatic.      Right Ear: External ear normal.      Left Ear: External ear normal.       Nose: No congestion.      Mouth/Throat:      Mouth: Mucous membranes are moist.   Eyes:      General: No scleral icterus.     Conjunctiva/sclera: Conjunctivae normal.   Cardiovascular:      Rate and Rhythm: Normal rate.   Pulmonary:      Effort: Pulmonary effort is normal.   Abdominal:      General: Abdomen is flat.   Musculoskeletal:      Cervical back: Normal range of motion and neck supple.      Comments: Straight leg raise to 20 degrees on the right causes significant increase in discomfort down the leg.  No numbness or sensory changes bilateral lower extremities.  Appearance is normal   Skin:     General: Skin is warm and dry.      Capillary Refill: Capillary refill takes less than 2 seconds.      Findings: No rash.   Neurological:      Mental Status: He is alert and oriented to person, place, and time.         ED Course                 Procedures                  No results found for this or any previous visit (from the past 24 hour(s)).    Medications - No data to display    Assessments & Plan (with Medical Decision Making)  63-year-old male with lumbar disc disease causing radiculopathy down the right leg.  After discussion of options including different kinds of pain pills and the risks and benefits, muscle relaxers, steroids we decided together to proceed with tramadol, tizanidine, Medrol Dosepak.  Patient was warned regarding their use.  Return to ER precautions and fall precautions discussed.  I also put in a referral to neurosurgery.  He may need a more recent MRI and consideration for an epidural steroid injection.  All questions answered prior to discharge.     I have reviewed the nursing notes.    I have reviewed the findings, diagnosis, plan and need for follow up with the patient.          New Prescriptions    METHYLPREDNISOLONE (MEDROL DOSEPAK) 4 MG TABLET THERAPY PACK    Follow Package Directions    TIZANIDINE (ZANAFLEX) 4 MG TABLET    Take 0.5-1 tablets (2-4 mg) by mouth 3 times daily as  needed for muscle spasms    TRAMADOL (ULTRAM) 50 MG TABLET    Take 1 tablet (50 mg) by mouth every 6 hours as needed for severe pain       Final diagnoses:   Lumbar radiculopathy       12/7/2023   [facility] EMERGENCY DEPT       Nik Gardiner MD  12/07/23 2311

## 2023-12-07 NOTE — ED TRIAGE NOTES
Pt states he is in too much pain right leg. From hip to toes.  No longer has any pain medications.  Alert and oriented.  States his leg has been giving out.     Triage Assessment (Adult)       Row Name 12/07/23 1013          Triage Assessment    Airway WDL WDL        Respiratory WDL    Respiratory WDL WDL        Skin Circulation/Temperature WDL    Skin Circulation/Temperature WDL WDL        Cognitive/Neuro/Behavioral WDL    Cognitive/Neuro/Behavioral WDL WDL

## 2023-12-07 NOTE — TELEPHONE ENCOUNTER
Order/Referral Request    Who is requesting:  PATIENT    Orders being requested:  MRI, NEUROLOGY     Reason service is needed/diagnosis:  can't use legs. Patient seen in the ED today    When are orders needed by: asap    Has this been discussed with Provider: Yes, ED provider    Does patient have a preference on a Group/Provider/Facility? [facility]    Does patient have an appointment scheduled?: No    Where to send orders: N/A    Okay to leave a detailed message?: Yes at Cell number on file:    Telephone Information:   Mobile [phone]

## 2023-12-07 NOTE — DISCHARGE INSTRUCTIONS
Return to the emergency department if develop new or worsening symptoms.  Follow-up with neurosurgery.  Use caution when taking the pain pills and muscle relaxers as they can make you sleepy.  I put in a referral to neurosurgery.  You may benefit from an epidural steroid injection.  They may want to get another MRI.

## 2023-12-08 ENCOUNTER — TELEPHONE (OUTPATIENT)
Dept: NEUROSURGERY | Facility: CLINIC | Age: 63
End: 2023-12-08
Payer: COMMERCIAL

## 2023-12-08 NOTE — TELEPHONE ENCOUNTER
1st attempt at referral workque. I called and left a voice mail. Any  can assist.    Referred by Nik Gardiner MD  Lumbar radiculopathy [M54.16]  MRI Lumbar Spine   PT  Inj  Surg

## 2023-12-11 NOTE — TELEPHONE ENCOUNTER
Looks like he was referred by the ER doctor.  They tried to schedule him but he did not respond their phone.

## 2023-12-12 NOTE — TELEPHONE ENCOUNTER
Attempted to call it stated the number you are attempting to call can not be reached at this time try again later.  Katharine Cosby MA 12/12/2023

## 2023-12-13 NOTE — TELEPHONE ENCOUNTER
I have attempted to reach patient by all numbers listed in chart. We will close encounter until patient calls back.  Katahrine Cosby MA 12/13/2023

## 2023-12-14 ENCOUNTER — TELEPHONE (OUTPATIENT)
Dept: NEUROSURGERY | Facility: CLINIC | Age: 63
End: 2023-12-14
Payer: COMMERCIAL

## 2023-12-14 NOTE — TELEPHONE ENCOUNTER
2nd attempt at work[employer] referral. I called and left a voice mail. Any  can assist.     Referred by Nik Gardiner MD  Lumbar radiculopathy [M54.16]  MRI Lumbar Spine   PT   Inj  Surg

## 2023-12-19 ENCOUNTER — TELEPHONE (OUTPATIENT)
Dept: NEUROSURGERY | Facility: CLINIC | Age: 63
End: 2023-12-19
Payer: COMMERCIAL

## 2023-12-19 NOTE — TELEPHONE ENCOUNTER
3rd attempt at referral from [facility]. I called and left a voice mail. Any  can assist.     Referred by Nik Gardiner MD  Lumbar radiculopathy [M54.16]  MRI   PT   Inj  Surg

## 2023-12-22 ENCOUNTER — APPOINTMENT (OUTPATIENT)
Dept: ULTRASOUND IMAGING | Facility: CLINIC | Age: 63
End: 2023-12-22
Attending: PHYSICIAN ASSISTANT
Payer: COMMERCIAL

## 2023-12-22 ENCOUNTER — TELEPHONE (OUTPATIENT)
Dept: NEUROSURGERY | Facility: CLINIC | Age: 63
End: 2023-12-22

## 2023-12-22 ENCOUNTER — HOSPITAL ENCOUNTER (EMERGENCY)
Facility: CLINIC | Age: 63
Discharge: HOME | End: 2023-12-22
Attending: PHYSICIAN ASSISTANT | Admitting: PHYSICIAN ASSISTANT
Payer: COMMERCIAL

## 2023-12-22 ENCOUNTER — APPOINTMENT (OUTPATIENT)
Dept: GENERAL RADIOLOGY | Facility: CLINIC | Age: 63
End: 2023-12-22
Attending: PHYSICIAN ASSISTANT
Payer: COMMERCIAL

## 2023-12-22 VITALS
OXYGEN SATURATION: 96 % | RESPIRATION RATE: 18 BRPM | SYSTOLIC BLOOD PRESSURE: 136 MMHG | DIASTOLIC BLOOD PRESSURE: 117 MMHG | WEIGHT: 168 LBS | BODY MASS INDEX: 24.52 KG/M2 | TEMPERATURE: 97.8 F | HEART RATE: 88 BPM

## 2023-12-22 DIAGNOSIS — M54.16 LUMBAR RADICULOPATHY: ICD-10-CM

## 2023-12-22 DIAGNOSIS — M79.604 RIGHT LEG PAIN: ICD-10-CM

## 2023-12-22 LAB
ANION GAP SERPL CALCULATED.3IONS-SCNC: 12 MMOL/L (ref 7–15)
BASOPHILS # BLD AUTO: 0.1 10E3/UL (ref 0–0.2)
BASOPHILS NFR BLD AUTO: 1 %
BUN SERPL-MCNC: 10.5 MG/DL (ref 8–23)
CALCIUM SERPL-MCNC: 9.3 MG/DL (ref 8.8–10.2)
CHLORIDE SERPL-SCNC: 102 MMOL/L (ref 98–107)
CREAT SERPL-MCNC: 0.67 MG/DL (ref 0.67–1.17)
DEPRECATED HCO3 PLAS-SCNC: 23 MMOL/L (ref 22–29)
EGFRCR SERPLBLD CKD-EPI 2021: >90 ML/MIN/1.73M2
EOSINOPHIL # BLD AUTO: 0.2 10E3/UL (ref 0–0.7)
EOSINOPHIL NFR BLD AUTO: 2 %
ERYTHROCYTE [DISTWIDTH] IN BLOOD BY AUTOMATED COUNT: 12.2 % (ref 10–15)
GLUCOSE SERPL-MCNC: 104 MG/DL (ref 70–99)
HCT VFR BLD AUTO: 45.5 % (ref 40–53)
HGB BLD-MCNC: 16 G/DL (ref 13.3–17.7)
IMM GRANULOCYTES # BLD: 0 10E3/UL
IMM GRANULOCYTES NFR BLD: 0 %
LYMPHOCYTES # BLD AUTO: 1.7 10E3/UL (ref 0.8–5.3)
LYMPHOCYTES NFR BLD AUTO: 17 %
MCH RBC QN AUTO: 30.5 PG (ref 26.5–33)
MCHC RBC AUTO-ENTMCNC: 35.2 G/DL (ref 31.5–36.5)
MCV RBC AUTO: 87 FL (ref 78–100)
MONOCYTES # BLD AUTO: 0.7 10E3/UL (ref 0–1.3)
MONOCYTES NFR BLD AUTO: 7 %
NEUTROPHILS # BLD AUTO: 7.3 10E3/UL (ref 1.6–8.3)
NEUTROPHILS NFR BLD AUTO: 73 %
NRBC # BLD AUTO: 0 10E3/UL
NRBC BLD AUTO-RTO: 0 /100
PLATELET # BLD AUTO: 424 10E3/UL (ref 150–450)
POTASSIUM SERPL-SCNC: 4.1 MMOL/L (ref 3.4–5.3)
RBC # BLD AUTO: 5.24 10E6/UL (ref 4.4–5.9)
SODIUM SERPL-SCNC: 137 MMOL/L (ref 135–145)
URATE SERPL-MCNC: 5.4 MG/DL (ref 3.4–7)
WBC # BLD AUTO: 9.9 10E3/UL (ref 4–11)

## 2023-12-22 PROCEDURE — 85025 COMPLETE CBC W/AUTO DIFF WBC: CPT | Performed by: PHYSICIAN ASSISTANT

## 2023-12-22 PROCEDURE — 93971 EXTREMITY STUDY: CPT | Mod: RT

## 2023-12-22 PROCEDURE — 82310 ASSAY OF CALCIUM: CPT | Performed by: PHYSICIAN ASSISTANT

## 2023-12-22 PROCEDURE — 250N000013 HC RX MED GY IP 250 OP 250 PS 637: Performed by: PHYSICIAN ASSISTANT

## 2023-12-22 PROCEDURE — 99285 EMERGENCY DEPT VISIT HI MDM: CPT | Mod: 25 | Performed by: PHYSICIAN ASSISTANT

## 2023-12-22 PROCEDURE — 84550 ASSAY OF BLOOD/URIC ACID: CPT | Performed by: PHYSICIAN ASSISTANT

## 2023-12-22 PROCEDURE — 250N000011 HC RX IP 250 OP 636: Performed by: PHYSICIAN ASSISTANT

## 2023-12-22 PROCEDURE — 99284 EMERGENCY DEPT VISIT MOD MDM: CPT | Performed by: PHYSICIAN ASSISTANT

## 2023-12-22 PROCEDURE — 96374 THER/PROPH/DIAG INJ IV PUSH: CPT | Performed by: PHYSICIAN ASSISTANT

## 2023-12-22 PROCEDURE — 36415 COLL VENOUS BLD VENIPUNCTURE: CPT | Performed by: PHYSICIAN ASSISTANT

## 2023-12-22 PROCEDURE — 73562 X-RAY EXAM OF KNEE 3: CPT | Mod: RT

## 2023-12-22 RX ORDER — KETOROLAC TROMETHAMINE 15 MG/ML
15 INJECTION, SOLUTION INTRAMUSCULAR; INTRAVENOUS ONCE
Status: COMPLETED | OUTPATIENT
Start: 2023-12-22 | End: 2023-12-22

## 2023-12-22 RX ORDER — TRAMADOL HYDROCHLORIDE 50 MG/1
100 TABLET ORAL ONCE
Status: COMPLETED | OUTPATIENT
Start: 2023-12-22 | End: 2023-12-22

## 2023-12-22 RX ORDER — TRAMADOL HYDROCHLORIDE 50 MG/1
50 TABLET ORAL EVERY 6 HOURS PRN
Qty: 10 TABLET | Refills: 0 | Status: SHIPPED | OUTPATIENT
Start: 2023-12-22 | End: 2024-01-22

## 2023-12-22 RX ORDER — METHYLPREDNISOLONE 4 MG
TABLET, DOSE PACK ORAL
Qty: 21 TABLET | Refills: 0 | Status: SHIPPED | OUTPATIENT
Start: 2023-12-22 | End: 2024-01-22

## 2023-12-22 RX ADMIN — TRAMADOL HYDROCHLORIDE 100 MG: 50 TABLET, COATED ORAL at 14:03

## 2023-12-22 RX ADMIN — KETOROLAC TROMETHAMINE 15 MG: 15 INJECTION, SOLUTION INTRAMUSCULAR; INTRAVENOUS at 14:01

## 2023-12-22 ASSESSMENT — ACTIVITIES OF DAILY LIVING (ADL)
ADLS_ACUITY_SCORE: 33
ADLS_ACUITY_SCORE: 35

## 2023-12-22 NOTE — ED PROVIDER NOTES
History     Chief Complaint   Patient presents with    Knee Pain     HPI  Iftikhar DOVE is a 63 year old male who returns to the emergency department for right knee pain.  States that he has not been able to sleep for the last week.  Pain starts in his anterior thigh and goes through the knee and into the anterior lower leg.  No numbness or tingling.  Today, he stated that when he tried to walk he felt like his knee was going to give out on him.  No recent fall or injury.  No trauma to the knee.  He states that he struggles with daily chronic back pain.  Currently, his pain is rated 9 on a scale of 10.  Denies any loss of bowel/bladder function.  Denies any saddle anesthesia.  No recent fever or chills.  No URI symptoms.  Denies any dysuria, frequency, urgency, or gross hematuria.  He was seen in the emergency department within the past 2.5 weeks.  Was referred to neurosurgery, but 3 attempts to contact him have been unsuccessful based on chart review.  See excerpt from recent ED visit below as well as MRI lumbar spine 2019 per below.          Excerpt from his recent ED visit on 12/7/23:    History          Chief Complaint   Patient presents with    Leg Pain      HPI  Iftikhar DOVE is a 63 year old male who presents to the ER secondary to back pain radiating down the right leg.  Symptoms have been around for a long time but they have flared up as of yesterday.  There is no trauma that is new.  He has previously been seen for this and had an MRI which showed degenerative changes most pronounced at L4-L5 and L5-S1.  That MRI was about 3 years ago.  Please see below.  He does not have any new numbness or incontinence.  The pain is made worse by straight leg raise.  No symptoms in the other leg.  The pain starts at the lumbar area more so on the right radiates across the right buttocks into the right groin and down the anterior aspect of the right leg all the way to the foot.  He was unable to sleep last  night.  He normally does not like to use any narcotic pills.  He tried meloxicam previously without relief.     IMPRESSION: Multilevel degenerative changes of the lumbar spine, as  detailed in the body of the report. The findings are most pronounced  at L4-L5 and L5-S1.    Assessments & Plan (with Medical Decision Making)  63-year-old male with lumbar disc disease causing radiculopathy down the right leg.  After discussion of options including different kinds of pain pills and the risks and benefits, muscle relaxers, steroids we decided together to proceed with tramadol, tizanidine, Medrol Dosepak.  Patient was warned regarding their use.  Return to ER precautions and fall precautions discussed.  I also put in a referral to neurosurgery.  He may need a more recent MRI and consideration for an epidural steroid injection.  All questions answered prior to discharge.      I have reviewed the nursing notes.     I have reviewed the findings, diagnosis, plan and need for follow up with the patient.                   New Prescriptions     METHYLPREDNISOLONE (MEDROL DOSEPAK) 4 MG TABLET THERAPY PACK    Follow Package Directions     TIZANIDINE (ZANAFLEX) 4 MG TABLET    Take 0.5-1 tablets (2-4 mg) by mouth 3 times daily as needed for muscle spasms     TRAMADOL (ULTRAM) 50 MG TABLET    Take 1 tablet (50 mg) by mouth every 6 hours as needed for severe pain         Final diagnoses:   Lumbar radiculopathy         12/7/2023   [facility] EMERGENCY DEPT        Nik Gardiner MD  12/07/23 1120        MRI LUMBAR SPINE WITHOUT CONTRAST   7/25/2019 12:54 PM      HISTORY: Radiculopathy, greater than 6 weeks conservative treatment,  persistent symptoms. Chronic bilateral low back pain without sciatica.        TECHNIQUE: Multiplanar multisequence MRI of the lumbar spine without  contrast.      COMPARISON: Lumbar spine radiographs dated 3/4/2019.     FINDINGS: Mild dextrocurvature of the upper lumbar  spine/thoracolumbar  junction. There is trace stepwise retrolisthesis of L3 on L4 and L4 on  L5. No fracture. Mild edema-like signal in the L4 vertebral body and  about the left L4-L5 facet joint, extending into the left L4 pedicle.  No concerning focal marrow signal abnormalities are identified  otherwise.     The conus terminates at L1-L2, which is normal. The appearance of the  conus and cauda equina are normal in the sagittal images. Paraspinous  soft tissues are within normal limits.     Segmental analysis:  T12-L1: Mild facet arthropathy. No spinal, lateral recess, or neural  foraminal stenosis.     L1-L2: Shallow symmetric disc bulge with moderate left and mild right  facet arthropathy. No spinal or lateral recess stenosis. There is mild  to moderate left neural foraminal stenosis, primarily related to facet  arthropathy. Right neural foramen is patent.     L2-L3: Shallow symmetric disc bulge with mild to moderate right  greater than left facet arthropathy. No significant spinal, lateral  recess, or neural foraminal stenosis.     L3-L4: Shallow symmetric disc bulge with moderate facet arthropathy.  No significant spinal or lateral recess stenosis. There is mild to  moderate right and mild left neural foraminal stenosis.     L4-L5: Symmetric disc bulge with superimposed central disc extrusion  with mild caudal migration of the herniated disc material along the  posterior aspect of the L5 superior endplate (series 3 image 8).  Moderate facet arthropathy. Mild right lateral recess narrowing,  without central stenosis. There is moderate right and mild left neural  foraminal stenosis.     L5-S1: Symmetric disc bulge with central annular fissure and severe  right and moderate to severe left facet arthropathy with  posterolateral endplate osteophytosis, most pronounced on the right.  There is no central or lateral recess narrowing. There is severe right  and mild to moderate left neural foraminal stenosis.            "                                                           IMPRESSION: Multilevel degenerative changes of the lumbar spine, as  detailed in the body of the report. The findings are most pronounced  at L4-L5 and L5-S1.     WILMER MEYERS MD            Allergies:  Allergies   Allergen Reactions    Pcn [Penicillins] Unknown     Happened as child, states \"was in the hospital\"       Problem List:    Patient Active Problem List    Diagnosis Date Noted    Tobacco use disorder 2021     Priority: Medium    Alcohol dependence in remission (H) 2021     Priority: Medium    Elevated prostate specific antigen (PSA) 2019     Priority: Medium    Vitamin D deficiency 2019     Priority: Medium    Umbilical hernia without obstruction and without gangrene 2019     Priority: Medium    Chronic bilateral low back pain without sciatica 2019     Priority: Medium    Essential hypertension 2019     Priority: Medium        Past Medical History:    Past Medical History:   Diagnosis Date    Alcohol dependence (H) 2021    Hypertension        Past Surgical History:    Past Surgical History:   Procedure Laterality Date    NO HISTORY OF SURGERY         Family History:    Family History   Problem Relation Age of Onset    Cancer Mother         colon -  75    Cancer Father         brain tumor    Cancer Sister         female cancer    Unknown/Adopted Sister     Unknown/Adopted Sister     Unknown/Adopted Sister     Unknown/Adopted Maternal Grandmother     Unknown/Adopted Maternal Grandfather     Unknown/Adopted Paternal Grandmother     Unknown/Adopted Paternal Grandfather     No Known Problems Son     No Known Problems Son        Social History:  Marital Status:  Single [1]  Social History     Tobacco Use    Smoking status: Every Day     Packs/day: 0.50     Years: 50.00     Additional pack years: 0.00     Total pack years: 25.00     Types: Cigarettes    Smokeless tobacco: Never   Vaping Use    Vaping " Use: Never used   Substance Use Topics    Alcohol use: Yes     Alcohol/week: 35.0 standard drinks of alcohol     Types: 42 Cans of beer per week     Comment: used  heavily - 3 beers/drink once a week since 1/19/2019    Drug use: Yes     Types: Marijuana     Comment: daily use        Medications:    methylPREDNISolone (MEDROL DOSEPAK) 4 MG tablet therapy pack  tiZANidine (ZANAFLEX) 4 MG tablet  traMADol (ULTRAM) 50 MG tablet  atenolol (TENORMIN) 50 MG tablet  meloxicam (MOBIC) 7.5 MG tablet  tamsulosin (FLOMAX) 0.4 MG capsule  vitamin D3 (CHOLECALCIFEROL) 50 mcg (2000 units) tablet          Review of Systems   All other systems reviewed and are negative.      Physical Exam   BP: 139/88  Pulse: 85  Temp: 97.8  F (36.6  C)  Resp: 18  Weight: 76.2 kg (168 lb)  SpO2: 96 %      Physical Exam  Vitals and nursing note reviewed.   Constitutional:       General: He is not in acute distress.     Appearance: He is not diaphoretic.   HENT:      Head: Normocephalic and atraumatic.      Right Ear: Tympanic membrane and external ear normal.      Left Ear: Tympanic membrane and external ear normal.      Nose: Nose normal.      Mouth/Throat:      Pharynx: No oropharyngeal exudate.   Eyes:      General: No scleral icterus.        Right eye: No discharge.         Left eye: No discharge.      Conjunctiva/sclera: Conjunctivae normal.      Pupils: Pupils are equal, round, and reactive to light.   Neck:      Thyroid: No thyromegaly.   Cardiovascular:      Rate and Rhythm: Normal rate and regular rhythm.      Heart sounds: Normal heart sounds. No murmur heard.  Pulmonary:      Effort: Pulmonary effort is normal. No respiratory distress.      Breath sounds: Normal breath sounds. No wheezing or rales.   Chest:      Chest wall: No tenderness.   Abdominal:      General: Bowel sounds are normal. There is no distension.      Palpations: Abdomen is soft. There is no mass.      Tenderness: There is no abdominal tenderness. There is no guarding or  rebound.   Musculoskeletal:         General: No deformity. Normal range of motion.      Cervical back: Normal range of motion and neck supple.      Comments: Right lower extremity is normal to inspection.  The knee joint without any erythema, swelling, increased warmth to palpation, rash, or deformity.  No bruising.  Mildly tender to palpation along the medial joint line.  No laxity with collateral ligament testing.  Normal range of motion but he does have pain in the leg with range of motion.  Minor tenderness to the upper posterior calf area.  No palpable cord.  Negative Homans' sign.  Remainder of the extremity is otherwise normal.  Hip joint is normal with good range of motion.  Foot exam normal.  Sensation intact light touch.  Distal pulses 2+.  SLR positive at 30 degrees on both sides.  Lower extremity strength is equal and appropriate.  Patient does not want to bear weight on the right leg when asked to do so.   Lymphadenopathy:      Cervical: No cervical adenopathy.   Skin:     General: Skin is warm and dry.      Capillary Refill: Capillary refill takes less than 2 seconds.      Findings: No erythema or rash.   Neurological:      Mental Status: He is alert and oriented to person, place, and time.      Cranial Nerves: No cranial nerve deficit.   Psychiatric:         Behavior: Behavior normal.         Thought Content: Thought content normal.               ED Course                 Procedures              Critical Care time:  none               Results for orders placed or performed during the hospital encounter of 12/22/23 (from the past 24 hour(s))   CBC with platelets differential    Narrative    The following orders were created for panel order CBC with platelets differential.  Procedure                               Abnormality         Status                     ---------                               -----------         ------                     CBC with platelets and d...[[id number]]                       Final result                 Please view results for these tests on the individual orders.   Basic metabolic panel   Result Value Ref Range    Sodium 137 135 - 145 mmol/L    Potassium 4.1 3.4 - 5.3 mmol/L    Chloride 102 98 - 107 mmol/L    Carbon Dioxide (CO2) 23 22 - 29 mmol/L    Anion Gap 12 7 - 15 mmol/L    Urea Nitrogen 10.5 8.0 - 23.0 mg/dL    Creatinine 0.67 0.67 - 1.17 mg/dL    GFR Estimate >90 >60 mL/min/1.73m2    Calcium 9.3 8.8 - 10.2 mg/dL    Glucose 104 (H) 70 - 99 mg/dL   Uric acid   Result Value Ref Range    Uric Acid 5.4 3.4 - 7.0 mg/dL   CBC with platelets and differential   Result Value Ref Range    WBC Count 9.9 4.0 - 11.0 10e3/uL    RBC Count 5.24 4.40 - 5.90 10e6/uL    Hemoglobin 16.0 13.3 - 17.7 g/dL    Hematocrit 45.5 40.0 - 53.0 %    MCV 87 78 - 100 fL    MCH 30.5 26.5 - 33.0 pg    MCHC 35.2 31.5 - 36.5 g/dL    RDW 12.2 10.0 - 15.0 %    Platelet Count 424 150 - 450 10e3/uL    % Neutrophils 73 %    % Lymphocytes 17 %    % Monocytes 7 %    % Eosinophils 2 %    % Basophils 1 %    % Immature Granulocytes 0 %    NRBCs per 100 WBC 0 <1 /100    Absolute Neutrophils 7.3 1.6 - 8.3 10e3/uL    Absolute Lymphocytes 1.7 0.8 - 5.3 10e3/uL    Absolute Monocytes 0.7 0.0 - 1.3 10e3/uL    Absolute Eosinophils 0.2 0.0 - 0.7 10e3/uL    Absolute Basophils 0.1 0.0 - 0.2 10e3/uL    Absolute Immature Granulocytes 0.0 <=0.4 10e3/uL    Absolute NRBCs 0.0 10e3/uL   US Lower Extremity Venous Duplex Right    Narrative    US LOWER EXTREMITY VENOUS DUPLEX RIGHT 12/22/2023 1:24 PM    CLINICAL HISTORY: right calf pain  TECHNIQUE: Venous Duplex ultrasound of the right lower extremity with  and without compression, augmentation and duplex. Color flow and  spectral Doppler with waveform analysis performed.    COMPARISON: None.    FINDINGS: Exam includes the common femoral, femoral, popliteal, and  contralateral common femoral veins as well as segmentally visualized  deep calf veins and greater saphenous vein.     RIGHT: No deep  vein thrombosis. No superficial thrombophlebitis. No  popliteal cyst.      Impression    IMPRESSION:  1.  No deep venous thrombosis in the right lower extremity.    ODILON MURPHY MD         SYSTEM ID:  [id number]   XR Knee Right 3 Views    Narrative    KNEE RIGHT 3 VIEWS  DATE/TIME: 12/22/2023 1:30 PM     INDICATION: Right-sided knee pain.   COMPARISON: 10/6/2023.      Impression    IMPRESSION:  1.  No fracture or joint malalignment.  2.  No joint effusion.  3.  Small anterior patella enthesophytes.    CHARU GUERRIER MD         SYSTEM ID:  [id number]       Medications   traMADol (ULTRAM) tablet 100 mg (100 mg Oral $Given 12/22/23 1403)   ketorolac (TORADOL) injection 15 mg (15 mg Intravenous $Given 12/22/23 1401)       Assessments & Plan (with Medical Decision Making)     Lumbar radiculopathy  Right leg pain     63 year old male with chronic low back pain who presents for evaluation of increasing right lower extremity discomfort over the past 1 week.  States that he was somewhat better after Medrol Dosepak treatment during ED visit on 12/7/2023, but symptoms have progressively been worsening over the past 1 week.  Today, he felt like he could not bear weight on his right knee due to the pain.  No red flag symptoms.  No recent fevers.  Neurosurgery has been reaching out to him to schedule an appointment, but he has not returned their call 3 different times.  Chart was reviewed in detail.  MRI report from 2019 lumbar spine reviewed per above.  On exam blood pressure 139/88, temperature 97.8, pulse 85, respiration 18, oxygen saturation 96% on room air.  Patient is in no acute distress.  When he stands, he stands only on his left leg.  He does not want to bear weight on his right lower extremity.  There is no evidence for trauma, swelling, or erythema of the right knee.  He does have some medial joint line tenderness.  Increased pain with range of motion.  Mild upper posterior calf discomfort.  No palpable cord.   Negative Homans' sign.  Sensation and pulses intact.  SLR positive at about 30 degrees.  See above for remainder of the exam.  IV was established.  Laboratory levels display no acute abnormality with CBC, uric acid, or basic metabolic panel.  Ultrasound negative for DVT.  Knee x-ray with no fracture.  No joint effusion.  I performed an independent review of the x-ray and agree with the findings.  Discussed with the patient that we did not find emergent abnormalities.  Symptoms and exam consistent with lumbar radiculopathy.  Previous ED visit to set up neurosurgery consultation, but the patient's niece apparently did not relay the message to the patient.  He was unaware of neurosurgery trying to set up an appointment.  Discussed that he needs to coordinate with his niece so he can have this issue taken care of.  I ordered an MRI so that he can schedule lab prior to his neurosurgery consultation.  I placed another neurosurgery consult ordered so that they call him again.  Hopefully he can figure out his communication issues.  In the interim, conservative care measures did help.  Therefore, we will repeat the Medrol Dosepak.  Refill on the tizanidine and tramadol provided.  Sedation risks reviewed.  No driving for 8 hours after taking either of these medications.  Strict ED return instructions reviewed with the patient, specifically for any red flag symptoms.  Follow-up for the MRI and with neurosurgery.  He was in agreement.  A family member came to the ED to drive him home.     I have reviewed the nursing notes.    I have reviewed the findings, diagnosis, plan and need for follow up with the patient.           Medical Decision Making  The patient's presentation was of moderate complexity (an acute complicated injury).    The patient's evaluation involved:  review of 1 test result(s) ordered prior to this encounter (see separate area of note for details)  ordering and/or review of 3+ test(s) in this encounter (see  separate area of note for details)    The patient's management necessitated moderate risk (prescription drug management including medications given in the ED).        Discharge Medication List as of 12/22/2023  2:37 PM        START taking these medications    Details   traMADol (ULTRAM) 50 MG tablet Take 1 tablet (50 mg) by mouth every 6 hours as needed for severe pain, Disp-10 tablet, R-0, E-Prescribe             Final diagnoses:   Lumbar radiculopathy   Right leg pain       Disclaimer: This note consists of symbols derived from keyboarding, dictation and/or voice recognition software. As a result, there may be errors in the script that have gone undetected. Please consider this when interpreting information found in this chart.      12/22/2023   [facility] EMERGENCY DEPT       Roe Bartholomew PA-C  12/22/23 5836

## 2023-12-22 NOTE — TELEPHONE ENCOUNTER
1st attempt at referral. I called and left a voice mail.     Referred by Roe Bartholomew PA-C  Lumbar radiculopathy [M54.16]  MRI   PT  Inj  Surg

## 2023-12-26 ENCOUNTER — APPOINTMENT (OUTPATIENT)
Dept: CT IMAGING | Facility: CLINIC | Age: 63
End: 2023-12-26
Attending: EMERGENCY MEDICINE
Payer: COMMERCIAL

## 2023-12-26 ENCOUNTER — HOSPITAL ENCOUNTER (EMERGENCY)
Facility: CLINIC | Age: 63
Discharge: HOME | End: 2023-12-26
Attending: EMERGENCY MEDICINE | Admitting: EMERGENCY MEDICINE
Payer: COMMERCIAL

## 2023-12-26 VITALS
HEART RATE: 56 BPM | WEIGHT: 164.6 LBS | RESPIRATION RATE: 18 BRPM | TEMPERATURE: 96.9 F | SYSTOLIC BLOOD PRESSURE: 131 MMHG | BODY MASS INDEX: 24.03 KG/M2 | OXYGEN SATURATION: 99 % | DIASTOLIC BLOOD PRESSURE: 73 MMHG

## 2023-12-26 DIAGNOSIS — R56.9 SEIZURE (H): ICD-10-CM

## 2023-12-26 LAB
ALBUMIN SERPL BCG-MCNC: 4.3 G/DL (ref 3.5–5.2)
ALP SERPL-CCNC: 67 U/L (ref 40–150)
ALT SERPL W P-5'-P-CCNC: 38 U/L (ref 0–70)
ANION GAP SERPL CALCULATED.3IONS-SCNC: 14 MMOL/L (ref 7–15)
AST SERPL W P-5'-P-CCNC: 25 U/L (ref 0–45)
BASOPHILS # BLD AUTO: 0.1 10E3/UL (ref 0–0.2)
BASOPHILS NFR BLD AUTO: 1 %
BILIRUB SERPL-MCNC: 0.5 MG/DL
BUN SERPL-MCNC: 8.9 MG/DL (ref 8–23)
CALCIUM SERPL-MCNC: 8.9 MG/DL (ref 8.8–10.2)
CHLORIDE SERPL-SCNC: 95 MMOL/L (ref 98–107)
CREAT SERPL-MCNC: 0.77 MG/DL (ref 0.67–1.17)
DEPRECATED HCO3 PLAS-SCNC: 24 MMOL/L (ref 22–29)
EGFRCR SERPLBLD CKD-EPI 2021: >90 ML/MIN/1.73M2
EOSINOPHIL # BLD AUTO: 0.1 10E3/UL (ref 0–0.7)
EOSINOPHIL NFR BLD AUTO: 1 %
ERYTHROCYTE [DISTWIDTH] IN BLOOD BY AUTOMATED COUNT: 12.2 % (ref 10–15)
ETHANOL SERPL-MCNC: 0.06 G/DL
GLUCOSE SERPL-MCNC: 99 MG/DL (ref 70–99)
HCT VFR BLD AUTO: 44.1 % (ref 40–53)
HGB BLD-MCNC: 14.9 G/DL (ref 13.3–17.7)
IMM GRANULOCYTES # BLD: 0 10E3/UL
IMM GRANULOCYTES NFR BLD: 0 %
LYMPHOCYTES # BLD AUTO: 1.5 10E3/UL (ref 0.8–5.3)
LYMPHOCYTES NFR BLD AUTO: 18 %
MCH RBC QN AUTO: 30.3 PG (ref 26.5–33)
MCHC RBC AUTO-ENTMCNC: 33.8 G/DL (ref 31.5–36.5)
MCV RBC AUTO: 90 FL (ref 78–100)
MONOCYTES # BLD AUTO: 0.9 10E3/UL (ref 0–1.3)
MONOCYTES NFR BLD AUTO: 10 %
NEUTROPHILS # BLD AUTO: 6 10E3/UL (ref 1.6–8.3)
NEUTROPHILS NFR BLD AUTO: 70 %
NRBC # BLD AUTO: 0 10E3/UL
NRBC BLD AUTO-RTO: 0 /100
PLATELET # BLD AUTO: 372 10E3/UL (ref 150–450)
POTASSIUM SERPL-SCNC: 3.5 MMOL/L (ref 3.4–5.3)
PROT SERPL-MCNC: 6.5 G/DL (ref 6.4–8.3)
RBC # BLD AUTO: 4.91 10E6/UL (ref 4.4–5.9)
SODIUM SERPL-SCNC: 133 MMOL/L (ref 135–145)
WBC # BLD AUTO: 8.6 10E3/UL (ref 4–11)

## 2023-12-26 PROCEDURE — 80053 COMPREHEN METABOLIC PANEL: CPT | Performed by: EMERGENCY MEDICINE

## 2023-12-26 PROCEDURE — 70450 CT HEAD/BRAIN W/O DYE: CPT

## 2023-12-26 PROCEDURE — 85025 COMPLETE CBC W/AUTO DIFF WBC: CPT | Performed by: EMERGENCY MEDICINE

## 2023-12-26 PROCEDURE — 96360 HYDRATION IV INFUSION INIT: CPT | Performed by: EMERGENCY MEDICINE

## 2023-12-26 PROCEDURE — 99285 EMERGENCY DEPT VISIT HI MDM: CPT | Mod: 25 | Performed by: EMERGENCY MEDICINE

## 2023-12-26 PROCEDURE — 99284 EMERGENCY DEPT VISIT MOD MDM: CPT | Mod: 25 | Performed by: EMERGENCY MEDICINE

## 2023-12-26 PROCEDURE — 82077 ASSAY SPEC XCP UR&BREATH IA: CPT | Performed by: EMERGENCY MEDICINE

## 2023-12-26 PROCEDURE — 258N000003 HC RX IP 258 OP 636: Performed by: EMERGENCY MEDICINE

## 2023-12-26 PROCEDURE — 36415 COLL VENOUS BLD VENIPUNCTURE: CPT | Performed by: EMERGENCY MEDICINE

## 2023-12-26 PROCEDURE — 93005 ELECTROCARDIOGRAM TRACING: CPT | Performed by: EMERGENCY MEDICINE

## 2023-12-26 PROCEDURE — 93010 ELECTROCARDIOGRAM REPORT: CPT | Performed by: EMERGENCY MEDICINE

## 2023-12-26 RX ORDER — LIDOCAINE 4 G/G
1 PATCH TOPICAL ONCE
Status: DISCONTINUED | OUTPATIENT
Start: 2023-12-26 | End: 2023-12-26 | Stop reason: HOSPADM

## 2023-12-26 RX ADMIN — SODIUM CHLORIDE 1000 ML: 9 INJECTION, SOLUTION INTRAVENOUS at 19:01

## 2023-12-26 ASSESSMENT — ACTIVITIES OF DAILY LIVING (ADL): ADLS_ACUITY_SCORE: 35

## 2023-12-27 ENCOUNTER — TELEPHONE (OUTPATIENT)
Dept: NEUROSURGERY | Facility: CLINIC | Age: 63
End: 2023-12-27
Payer: COMMERCIAL

## 2023-12-27 NOTE — ED TRIAGE NOTES
"PT was sitting in his wheelchair when he passed out about 45 minutes ago. Pt was smoking marijuana, and drinking alcohol prior to passing out. PT states \"I have a few drinks every day\".         "

## 2023-12-27 NOTE — ED PROVIDER NOTES
"  History     Chief Complaint   Patient presents with    Loss of Consciousness     HPI  Iftikhar DOVE is a 63 year old male who presents after loss of consciousness at home.  Paramedics reported family had reported seizure-like activity.  Patient endorses 1 previous seizure.  He does drink daily.  Last alcohol was about an hour ago.  No injury.  He apparently slumped back in his wheelchair during this episode.  He was incontinent of urine.  He is not on any medications for seizures.  He does not drive    Allergies:  Allergies   Allergen Reactions    Pcn [Penicillins] Unknown     Happened as child, states \"was in the hospital\"       Problem List:    Patient Active Problem List    Diagnosis Date Noted    Tobacco use disorder 2021     Priority: Medium    Alcohol dependence in remission (H) 2021     Priority: Medium    Elevated prostate specific antigen (PSA) 2019     Priority: Medium    Vitamin D deficiency 2019     Priority: Medium    Umbilical hernia without obstruction and without gangrene 2019     Priority: Medium    Chronic bilateral low back pain without sciatica 2019     Priority: Medium    Essential hypertension 2019     Priority: Medium        Past Medical History:    Past Medical History:   Diagnosis Date    Alcohol dependence (H) 2021    Hypertension        Past Surgical History:    Past Surgical History:   Procedure Laterality Date    NO HISTORY OF SURGERY         Family History:    Family History   Problem Relation Age of Onset    Cancer Mother         colon -  75    Cancer Father         brain tumor    Cancer Sister         female cancer    Unknown/Adopted Sister     Unknown/Adopted Sister     Unknown/Adopted Sister     Unknown/Adopted Maternal Grandmother     Unknown/Adopted Maternal Grandfather     Unknown/Adopted Paternal Grandmother     Unknown/Adopted Paternal Grandfather     No Known Problems Son     No Known Problems Son        Social " History:  Marital Status:  Single [1]  Social History     Tobacco Use    Smoking status: Every Day     Packs/day: 0.50     Years: 50.00     Additional pack years: 0.00     Total pack years: 25.00     Types: Cigarettes    Smokeless tobacco: Never   Vaping Use    Vaping Use: Never used   Substance Use Topics    Alcohol use: Yes     Alcohol/week: 35.0 standard drinks of alcohol     Types: 42 Cans of beer per week     Comment: used  heavily - 3 beers/drink once a week since 1/19/2019    Drug use: Yes     Types: Marijuana     Comment: daily use        Medications:    atenolol (TENORMIN) 50 MG tablet  meloxicam (MOBIC) 7.5 MG tablet  methylPREDNISolone (MEDROL DOSEPAK) 4 MG tablet therapy pack  tamsulosin (FLOMAX) 0.4 MG capsule  tiZANidine (ZANAFLEX) 4 MG tablet  vitamin D3 (CHOLECALCIFEROL) 50 mcg (2000 units) tablet          Review of Systems  All other systems are reviewed and are negative    Physical Exam   BP: 120/79  Pulse: (!) 39  Temp: 96.9  F (36.1  C)  Resp: 18  Weight: 74.7 kg (164 lb 9.6 oz)  SpO2: 96 %      Physical Exam  Vitals and nursing note reviewed.   Constitutional:       General: He is not in acute distress.     Appearance: He is well-developed. He is not diaphoretic.      Comments: Somewhat disheveled   HENT:      Head: Normocephalic and atraumatic.      Nose: Nose normal.      Mouth/Throat:      Mouth: Mucous membranes are moist.      Pharynx: Oropharynx is clear.   Eyes:      General: No scleral icterus.        Right eye: No discharge.         Left eye: No discharge.      Conjunctiva/sclera: Conjunctivae normal.   Cardiovascular:      Rate and Rhythm: Normal rate and regular rhythm.      Heart sounds: Normal heart sounds. No murmur heard.  Pulmonary:      Effort: Pulmonary effort is normal. No respiratory distress.      Breath sounds: Normal breath sounds. No stridor.   Abdominal:      General: There is no distension.      Palpations: Abdomen is soft.      Tenderness: There is no abdominal  tenderness. There is no guarding or rebound.   Musculoskeletal:         General: Normal range of motion.      Cervical back: Normal range of motion and neck supple.   Skin:     General: Skin is warm and dry.      Coloration: Skin is not pale.      Findings: No erythema or rash.   Neurological:      General: No focal deficit present.      Mental Status: He is alert.      Cranial Nerves: No cranial nerve deficit.      Motor: No abnormal muscle tone.   Psychiatric:         Mood and Affect: Mood normal.         ED Course                 Procedures         EKG reveals sinus bradycardia at 52 bpm.  No acute ST segment or T wave changes noted.  Interpreted by myself         Results for orders placed or performed during the hospital encounter of 12/26/23 (from the past 24 hour(s))   CBC with platelets differential    Narrative    The following orders were created for panel order CBC with platelets differential.  Procedure                               Abnormality         Status                     ---------                               -----------         ------                     CBC with platelets and d...[[id number]]                      Final result                 Please view results for these tests on the individual orders.   Comprehensive metabolic panel   Result Value Ref Range    Sodium 133 (L) 135 - 145 mmol/L    Potassium 3.5 3.4 - 5.3 mmol/L    Carbon Dioxide (CO2) 24 22 - 29 mmol/L    Anion Gap 14 7 - 15 mmol/L    Urea Nitrogen 8.9 8.0 - 23.0 mg/dL    Creatinine 0.77 0.67 - 1.17 mg/dL    GFR Estimate >90 >60 mL/min/1.73m2    Calcium 8.9 8.8 - 10.2 mg/dL    Chloride 95 (L) 98 - 107 mmol/L    Glucose 99 70 - 99 mg/dL    Alkaline Phosphatase 67 40 - 150 U/L    AST 25 0 - 45 U/L    ALT 38 0 - 70 U/L    Protein Total 6.5 6.4 - 8.3 g/dL    Albumin 4.3 3.5 - 5.2 g/dL    Bilirubin Total 0.5 <=1.2 mg/dL   Ethyl Alcohol Level   Result Value Ref Range    Alcohol ethyl 0.06 (H) <=0.01 g/dL   CBC with platelets and  differential   Result Value Ref Range    WBC Count 8.6 4.0 - 11.0 10e3/uL    RBC Count 4.91 4.40 - 5.90 10e6/uL    Hemoglobin 14.9 13.3 - 17.7 g/dL    Hematocrit 44.1 40.0 - 53.0 %    MCV 90 78 - 100 fL    MCH 30.3 26.5 - 33.0 pg    MCHC 33.8 31.5 - 36.5 g/dL    RDW 12.2 10.0 - 15.0 %    Platelet Count 372 150 - 450 10e3/uL    % Neutrophils 70 %    % Lymphocytes 18 %    % Monocytes 10 %    % Eosinophils 1 %    % Basophils 1 %    % Immature Granulocytes 0 %    NRBCs per 100 WBC 0 <1 /100    Absolute Neutrophils 6.0 1.6 - 8.3 10e3/uL    Absolute Lymphocytes 1.5 0.8 - 5.3 10e3/uL    Absolute Monocytes 0.9 0.0 - 1.3 10e3/uL    Absolute Eosinophils 0.1 0.0 - 0.7 10e3/uL    Absolute Basophils 0.1 0.0 - 0.2 10e3/uL    Absolute Immature Granulocytes 0.0 <=0.4 10e3/uL    Absolute NRBCs 0.0 10e3/uL   CT Head w/o Contrast    Narrative    EXAM: CT HEAD W/O CONTRAST  LOCATION: [facility]  DATE: 12/26/2023    INDICATION: Seizure  COMPARISON: None.  TECHNIQUE: Routine CT Head without IV contrast. Multiplanar reformats. Dose reduction techniques were used.    FINDINGS:  INTRACRANIAL CONTENTS: No intracranial hemorrhage, extraaxial collection, or mass effect.  No CT evidence of acute infarct. Small area of gliosis in the right frontal operculum. Mild generalized volume loss. No hydrocephalus.     VISUALIZED ORBITS/SINUSES/MASTOIDS: No intraorbital abnormality. No paranasal sinus mucosal disease. No middle ear or mastoid effusion.    BONES/SOFT TISSUES: No acute abnormality.      Impression    IMPRESSION:  1.  No acute intracranial process.  2.  Small area of gliosis in the right frontal operculum.       Medications   Lidocaine (LIDOCARE) 4 % Patch 1 patch (1 patch Transdermal Not Given 12/26/23 2027)   sodium chloride 0.9% BOLUS 1,000 mL (0 mLs Intravenous Stopped 12/26/23 2000)       Assessments & Plan (with Medical Decision Making)  63-year-old male who appears to have had a seizure at home.  Nephew  reports some stiffening up his eyes looking blank and incontinent of urine.  He also had a postictal phase.  He is on tramadol.  Also has some alcohol use.  Workup here was benign.  Head CT negative.  Labs as above.  Offered further workup and monitoring which patient declined any just wanted to leave.  Some chronic right leg pain noted.  Strongly recommended he stop the tramadol.  Offered Lidoderm patch which he declined.  I recommended follow-up with her primary care team.     I have reviewed the nursing notes.    I have reviewed the findings, diagnosis, plan and need for follow up with the patient.          Current Discharge Medication List          Final diagnoses:   Seizure (H)       12/26/2023   [facility] EMERGENCY DEPT       Gilbert Plaza MD  12/26/23 2030

## 2023-12-27 NOTE — TELEPHONE ENCOUNTER
2nd attempt at referral from vega. I called and left a voice mail.     Referred by Roe Bartholomew PA-C (Hospital follow up)  Lumbar radiculopathy [M54.16]  MRI   PT  Inj  Surg

## 2023-12-27 NOTE — DISCHARGE INSTRUCTIONS
Recommend stopping the tramadol due to increased seizure risk.  Also recommend Lidoderm patch as needed for pain of the right leg

## 2023-12-28 NOTE — TELEPHONE ENCOUNTER
General Call    Contacts         Type Contact Phone/Fax    12/07/2023 02:00 PM CST Phone (Incoming) lizeth, enmanuel (Emergency Contact) [phone]    12/12/2023 07:53 AM CST Phone (Outgoing) lizethjackie sanchesrina (Emergency Contact) [phone]    No Answer/Busy     12/13/2023 10:52 AM CST Phone (Outgoing) Iftikhar DOVE R (Self) [phone] (H)    12/28/2023 04:04 PM CST Phone (Incoming) Iftikhar DOVE (Self) [phone]    12/28/2023 04:06 PM CST Phone (Incoming) ENMANUEL [phone]            What are your questions or concerns:  Writer had received a call from hermelindo Sumner regarding message below - the numbers that were on patients chart were no longer in service the only number in service for patient is [phone] which is Enmanuel's number.    She would like to speak with nursing or patients PCP about getting patient in asap for an ED follow up from earlier this month.      Okay to leave a detailed message?: Yes at Cell number on file:    Telephone Information:   Mobile [phone]

## 2024-01-02 ENCOUNTER — TELEPHONE (OUTPATIENT)
Dept: FAMILY MEDICINE | Facility: CLINIC | Age: 64
End: 2024-01-02
Payer: COMMERCIAL

## 2024-01-03 NOTE — TELEPHONE ENCOUNTER
Patient is not doing well at all.  He can't walk without a walked.  Niece is concerned about the seizers. I did get him in sooner for the lumbar MRI but she is concerned about the seizers.

## 2024-01-03 NOTE — CONFIDENTIAL NOTE
NEUROSURGERY- NEW PREVISIT PLANNING       Record Status/Location     Referring Provider Referral Roe Bartholomew PA-C    Diagnosis Referral M54.16 (ICD-10-CM) - Lumbar radiculopathy    MRI (HEAD, NECK, SPINE) Scheduled 1/4/24    CT Na    [facility]    INJECTION Na    PHYSICAL THERAPY Encounter 2021 x2   SURGERY Na

## 2024-01-03 NOTE — TELEPHONE ENCOUNTER
----- Message from Gilbert Plaza MD sent at 12/26/2023  8:17 PM CST -----  Patient into today after possible seizure.  He is on tramadol for right leg pain.  Family states they have been having trouble connecting with clinic.  Wondering if you can help guide them further for workup and/or pain management.  Would stop the tramadol obviously with the seizure.  Can reach his sister  at [phone].

## 2024-01-04 ENCOUNTER — HOSPITAL ENCOUNTER (OUTPATIENT)
Dept: MRI IMAGING | Facility: CLINIC | Age: 64
Discharge: HOME | End: 2024-01-04
Attending: PHYSICIAN ASSISTANT | Admitting: PHYSICIAN ASSISTANT
Payer: COMMERCIAL

## 2024-01-04 DIAGNOSIS — M54.16 LUMBAR RADICULOPATHY: ICD-10-CM

## 2024-01-04 PROCEDURE — 72148 MRI LUMBAR SPINE W/O DYE: CPT

## 2024-01-04 NOTE — PROGRESS NOTES
Appropriate assistive devices provided during their visit. Yes (Yes, No, N/A) Walker (list device)    Exam table and/or cart  placed in the lowest position. Yes (Yes, No, N/A)    Brakes on tables/carts/wheelchairs used at all times. Yes (Yes, No, N/A)    Non slip footwear applied. NA (Yes, No, NA)    Patient was accompanied by staff throughout visit. Yes (Yes, No, N/A)    Equipment safety straps used. NA (Yes, No, N/A)    Assist with toileting. NA (Yes, No, N/A)

## 2024-01-05 NOTE — TELEPHONE ENCOUNTER
Pt scheduled with Dr. Cutler, per Gail Rajan CNP pt should be scheduled with [facility] unless pt having red flag s/s.     Per chart review, pt has RLE knee pain. No N/T, no saddle anesthesia, no decrease bowel or bladder function. No red flags.     Routed to scheduling.

## 2024-01-11 ENCOUNTER — PRE VISIT (OUTPATIENT)
Dept: NEUROSURGERY | Facility: CLINIC | Age: 64
End: 2024-01-11

## 2024-01-19 ENCOUNTER — TELEPHONE (OUTPATIENT)
Dept: NEUROSURGERY | Facility: CLINIC | Age: 64
End: 2024-01-19
Payer: COMMERCIAL

## 2024-01-19 NOTE — PROGRESS NOTES
[facility] Neurosurgery  Neurosurgery Clinic Visit      CC: Right lumbar radiculopathy    Primary care Provider: Blaise Cox    Reason For Visit:   I was asked by Roe Bartholomew PA-C to consult on the patient for right lumbar radiculopathy.    HPI: Iftikhar DOVE is a 64 year old male with acute on chronic right lumbar radiculopathy. Patient has been evaluated multiple times in the Emergency Department for the same symptoms, most recently on 12/22/23 with right low back pain radiating to right anterior thigh, knee, and intermittently to anterior lower leg. Patient was prescribed Tramadol, Tizanidine, and Medrol Dose Pack. Patient was re-evaluated in the Emergency Department 12/26/23 with seizure like activity and advised to discontinue Tramadol.    Today patient reports chronic right lower back pain that waxes and wanes in intensity.  He states he had mild improvement in symptoms with Medrol Dosepak.  Current symptoms originate in right lower back radiating to right hip, anterior thigh, right knee, intermittently to right anterior shin without associated paresthesias.  No left leg symptoms.  Patient does note chronic right lower extremity weakness.  Patient started using a cane when ambulating several months ago due to increased right hip and knee pain.  Patient is currently using Tylenol to manage symptoms due to running out of medication prescribed by the emergency department.  Has completed physical therapy for lower back pain approximately 2 years ago, no recent PT.     No recent fall/trauma, bowel or bladder dysfunction, saddle anesthesia.  No history of spinal injections or spine surgery.        Past Medical History:   Diagnosis Date    Alcohol dependence (H) 02/24/2021    Hypertension        Past Medical History reviewed with patient during visit.    Past Surgical History:   Procedure Laterality Date    NO HISTORY OF SURGERY       Past Surgical History reviewed with patient during visit.    Current  "Outpatient Medications   Medication    tiZANidine (ZANAFLEX) 2 MG tablet    atenolol (TENORMIN) 50 MG tablet    meloxicam (MOBIC) 7.5 MG tablet    tamsulosin (FLOMAX) 0.4 MG capsule    tiZANidine (ZANAFLEX) 4 MG tablet    vitamin D3 (CHOLECALCIFEROL) 50 mcg (2000 units) tablet     No current facility-administered medications for this visit.       Allergies   Allergen Reactions    Pcn [Penicillins] Unknown     Happened as child, states \"was in the hospital\"       Social History     Socioeconomic History    Marital status: Single   Tobacco Use    Smoking status: Every Day     Packs/day: 0.50     Years: 50.00     Additional pack years: 0.00     Total pack years: 25.00     Types: Cigarettes    Smokeless tobacco: Never   Vaping Use    Vaping Use: Never used   Substance and Sexual Activity    Alcohol use: Yes     Alcohol/week: 35.0 standard drinks of alcohol     Types: 42 Cans of beer per week     Comment: used  heavily - 3 beers/drink once a week since 1/19/2019    Drug use: Yes     Types: Marijuana     Comment: daily use    Sexual activity: Not Currently     Partners: Female     Social Determinants of Health     Financial Resource Strain: Low Risk  (10/6/2023)    Financial Resource Strain     Within the past 12 months, have you or your family members you live with been unable to get utilities (heat, electricity) when it was really needed?: No   Food Insecurity: High Risk (10/6/2023)    Food Insecurity     Within the past 12 months, did you worry that your food would run out before you got money to buy more?: Yes     Within the past 12 months, did the food you bought just not last and you didn t have money to get more?: Yes   Transportation Needs: High Risk (10/6/2023)    Transportation Needs     Within the past 12 months, has lack of transportation kept you from medical appointments, getting your medicines, non-medical meetings or appointments, work, or from getting things that you need?: Yes   Interpersonal Safety: Low " Risk  (10/6/2023)    Interpersonal Safety     Do you feel physically and emotionally safe where you currently live?: Yes     Within the past 12 months, have you been hit, slapped, kicked or otherwise physically hurt by someone?: No     Within the past 12 months, have you been humiliated or emotionally abused in other ways by your partner or ex-partner?: No   Housing Stability: High Risk (10/6/2023)    Housing Stability     Do you have housing? : Yes     Are you worried about losing your housing?: Yes       Family History   Problem Relation Age of Onset    Cancer Mother         colon -  75    Cancer Father         brain tumor    Cancer Sister         female cancer    Unknown/Adopted Sister     Unknown/Adopted Sister     Unknown/Adopted Sister     Unknown/Adopted Maternal Grandmother     Unknown/Adopted Maternal Grandfather     Unknown/Adopted Paternal Grandmother     Unknown/Adopted Paternal Grandfather     No Known Problems Son     No Known Problems Son          ROS: 10 point ROS neg other than the symptoms noted above in the HPI.    Vital Signs:   /84   Pulse 70   Temp 98.4  F (36.9  C) (Temporal)   Resp 18   Wt 73.9 kg (163 lb)   BMI 23.79 kg/m        Examination:  Constitutional:  Alert, well nourished, NAD.  Memory: recent and remote memory   HEENT: Normocephalic, atraumatic.   Pulm:  Without shortness of breath   CV:  No pitting edema of BLE.      Neurological:  Awake  Alert  Appropriate  Speech clear    Motor exam:   Hip Flexion:                 Right: 5/5  Left:  5/5  Knee flexion:               Right:  5/5  Left:  5/5  Knee extension:          Right:  5/5  Left:  5/5  Plantar Flexion:           Right:  5/5  Left:  5/5  Dorsal Flexion:            Right:  4+/5  Left:  5/5  EHL:                            Right:  4+/5  Left:  5/5     Sensation normal to bilateral lower extremities  Muscle tone to bilateral lower extremities   Gait: Able to stand from a seated position.  Ambulates with cane,  favors left leg.  Straight leg raise negative bilaterally.  Clonus negative bilaterally.    When asked where patient's pain is, patient points to right lower lumbar spine and right SI joint.  Mildly tender over right SI joint.    Negative SI joint compression test bilaterally.        Imaging:   Imaging Interpretation provided by radiologist.   EXAM: MR LUMBAR SPINE WITHOUT CONTRAST  1/4/2024 10:28 AM      HISTORY: Severe low back pain with right lower extremity radicular  pain; Lumbar radiculopathy.        COMPARISON: MRI 7/25/2019     TECHNIQUE: Multiplanar, multisequence MR images of the lumbar spine  were obtained without intravenous contrast..     CONTRAST: None.     FINDINGS:  Motion degraded sagittal and axial images. There are 5 lumbar type  vertebrae, used for the purposes of this dictation. No signal changes  suggesting acute fracture or traumatic subluxation. No suspicious  marrow lesion. Conus tip at approximately L1. Normal cauda equina.  Nonfocal extraspinal structures.      On a level by level basis, the findings are as follows:     L1-L2: Preserved disc height and signal for patient age. Normal facets  for patient age. No significant spinal canal or neural foraminal  stenosis.     L2-L3: Stable mild loss of disc height, intradiscal T2 signal. Stable  symmetric disc bulge and right facet arthropathy causing mild right  neural foraminal stenosis. No high-grade spinal canal or left neural  foraminal stenosis.     L3-L4: Stable mild loss of disc height, intradiscal T2 signal. Stable  posterior disc osteophyte complex and bilateral facet arthropathy  right greater than left causing moderate to severe right and  mild-to-moderate left neural foraminal stenosis. No high-grade spinal  canal stenosis.     L4-L5: Stable mild loss of disc height, intradiscal T2 signal. Stable  posterior disc osteophyte complex and right greater than left facet  arthropathy causing moderate to severe right and mild to moderate  left  neural foraminal stenosis. No high-grade spinal canal stenosis.     L5-S1: Stable mild loss of disc height, intradiscal T2 signal. Mild  right opposing endplate fibrovascular degenerative signal. Stable  posterior disc osteophyte complex and bilateral facet arthropathy  right greater than left causing severe right and moderate left neural  foraminal stenosis. No high-grade spinal canal stenosis.                                                                      IMPRESSION:   1.  No significant progression of multilevel lumbar spondylosis, as  detailed, since comparison MRI performed on 7/25/2019.  2.  No evidence of acute bone or soft tissue injury of the lumbar  spine.       Examination:  XR PELVIS AND HIP RIGHT 1 VIEW  Date:  10/6/2023 2:38 PM   Clinical Information: Right hip pain.     Comparison: none.                                            Impression:   1.  Normal joint alignment. No fracture or bone lesion. Maintained  joint spacing in both hips. Mild-moderate degenerative arthritic  spurring in both hips, left greater than right. Moderate-advanced  hypertrophic changes in the lower lumbar spine.      Assessment/Plan:   Iftikhar DOVE is a 64 year old male with acute on chronic right lumbar radiculopathy. Patient has been evaluated multiple times in the Emergency Department for the same symptoms, most recently on 12/22/23 with right low back pain radiating to right hip, anterior thigh, knee, and anterior lower leg without associated paresthesias.  Patient was prescribed Tizanidine and Medrol Dosepak with temporary improvement of symptoms.  Patient is currently using Tylenol to manage symptoms due to running out of medications prescribed by ED.  Patient has completed physical therapy in the past for his back approximately 2 years ago,  no recent PT.  No history of spine surgery or injections.  Lumbar MRI reviewed and discussed with patient.  Lumbar MRI does note multiple levels of moderate to  severe right foraminal stenosis at L3-L4, L4-L5 and L5-S1.    Plan will be to have patient start physical therapy for lower back pain and right radiculopathy.    Will also refer patient to Dr. Longoria for injection.   Will prescribe patient 1 refill of Tizanidine, advised patient if he needs further prescription refills to contact his PCP.  Patient can continue using Tylenol as needed for pain.    Patient verbalized understanding and is in agreement with the plan.       Patient Instructions   Placed order for 1 refill of tizanidine, if you need further prescriptions please contact your primary care provider.    Placed order for lumbar injection with Dr. Longoria, schedulers will contact you in the next few days to schedule the appointment.    Placed order for physical therapy for lower back pain and right lower extremity pain, schedulers will contact you the next few days to schedule the appointment.    Can continue using Tylenol as needed for pain control.    Celina Benitez PA-C  [facility] Neurosurgery  [address] [address]

## 2024-01-22 ENCOUNTER — OFFICE VISIT (OUTPATIENT)
Dept: NEUROSURGERY | Facility: CLINIC | Age: 64
End: 2024-01-22
Payer: COMMERCIAL

## 2024-01-22 VITALS
HEART RATE: 70 BPM | WEIGHT: 163 LBS | BODY MASS INDEX: 23.79 KG/M2 | TEMPERATURE: 98.4 F | SYSTOLIC BLOOD PRESSURE: 136 MMHG | RESPIRATION RATE: 18 BRPM | DIASTOLIC BLOOD PRESSURE: 84 MMHG

## 2024-01-22 DIAGNOSIS — M54.16 LUMBAR RADICULOPATHY: ICD-10-CM

## 2024-01-22 DIAGNOSIS — G89.29 CHRONIC RIGHT-SIDED LOW BACK PAIN WITH RIGHT-SIDED SCIATICA: Primary | ICD-10-CM

## 2024-01-22 DIAGNOSIS — N40.1 BENIGN PROSTATIC HYPERPLASIA WITH NOCTURIA: ICD-10-CM

## 2024-01-22 DIAGNOSIS — R35.1 BENIGN PROSTATIC HYPERPLASIA WITH NOCTURIA: ICD-10-CM

## 2024-01-22 DIAGNOSIS — M54.41 CHRONIC RIGHT-SIDED LOW BACK PAIN WITH RIGHT-SIDED SCIATICA: Primary | ICD-10-CM

## 2024-01-22 PROCEDURE — 99203 OFFICE O/P NEW LOW 30 MIN: CPT

## 2024-01-22 RX ORDER — TAMSULOSIN HYDROCHLORIDE 0.4 MG/1
CAPSULE ORAL
Qty: 90 CAPSULE | Refills: 0 | Status: SHIPPED | OUTPATIENT
Start: 2024-01-22 | End: 2024-04-30

## 2024-01-22 RX ORDER — TIZANIDINE 2 MG/1
2 TABLET ORAL 3 TIMES DAILY PRN
Qty: 30 TABLET | Refills: 0 | Status: SHIPPED | OUTPATIENT
Start: 2024-01-22 | End: 2024-04-30

## 2024-01-22 ASSESSMENT — PAIN SCALES - GENERAL: PAINLEVEL: EXTREME PAIN (8)

## 2024-01-22 NOTE — NURSING NOTE
"Iftikhar DOVE is a 64 year old male who presents for:  Chief Complaint   Patient presents with    Neurologic Problem     Lumbar radiculopathy        Initial Vitals:  /84   Pulse 70   Temp 98.4  F (36.9  C) (Temporal)   Resp 18   Wt 163 lb (73.9 kg)   BMI 23.79 kg/m   Estimated body mass index is 23.79 kg/m  as calculated from the following:    Height as of 10/6/23: 5' 9.4\" (1.763 m).    Weight as of this encounter: 163 lb (73.9 kg).. Body surface area is 1.9 meters squared. BP completed using cuff size: regular  Extreme Pain (8)    Nursing Comments:     Shira Moreau    "

## 2024-01-22 NOTE — PATIENT INSTRUCTIONS
Placed order for 1 refill of tizanidine, if you need further prescriptions please contact your primary care provider.    Placed order for lumbar injection with Dr. Longoria, schedulers will contact you in the next few days to schedule the appointment.     Placed order for physical therapy for lower back pain and right lower extremity pain, schedulers will contact you the next few days to schedule the appointment.    Can continue using Tylenol as needed for pain control.

## 2024-01-22 NOTE — LETTER
1/22/2024         RE: Iftikhar DOVE  [address] [facility] [address]        Dear Colleague,    Thank you for referring your patient, Iftikhar DOVE, to the [facility] [place]. Please see a copy of my visit note below.    [facility]  Neurosurgery Clinic Visit      CC: Right lumbar radiculopathy    Primary care Provider: Blaise Cox    Reason For Visit:   I was asked by Roe Bartholomew PA-C to consult on the patient for right lumbar radiculopathy.    HPI: Iftikhar DOVE is a 64 year old male with acute on chronic right lumbar radiculopathy. Patient has been evaluated multiple times in the Emergency Department for the same symptoms, most recently on 12/22/23 with right low back pain radiating to right anterior thigh, knee, and intermittently to anterior lower leg. Patient was prescribed Tramadol, Tizanidine, and Medrol Dose Pack. Patient was re-evaluated in the Emergency Department 12/26/23 with seizure like activity and advised to discontinue Tramadol.    Today patient reports chronic right lower back pain that waxes and wanes in intensity.  He states he had mild improvement in symptoms with Medrol Dosepak.  Current symptoms originate in right lower back radiating to right hip, anterior thigh, right knee, intermittently to right anterior shin without associated paresthesias.  No left leg symptoms.  Patient does note chronic right lower extremity weakness.  Patient started using a cane when ambulating several months ago due to increased right hip and knee pain.  Patient is currently using Tylenol to manage symptoms due to running out of medication prescribed by the emergency department.  Has completed physical therapy for lower back pain approximately 2 years ago, no recent PT.     No recent fall/trauma, bowel or bladder dysfunction, saddle anesthesia.  No history of spinal injections or spine surgery.        Past Medical History:   Diagnosis Date      "Alcohol dependence (H) 02/24/2021     Hypertension        Past Medical History reviewed with patient during visit.    Past Surgical History:   Procedure Laterality Date     NO HISTORY OF SURGERY       Past Surgical History reviewed with patient during visit.    Current Outpatient Medications   Medication     tiZANidine (ZANAFLEX) 2 MG tablet     atenolol (TENORMIN) 50 MG tablet     meloxicam (MOBIC) 7.5 MG tablet     tamsulosin (FLOMAX) 0.4 MG capsule     tiZANidine (ZANAFLEX) 4 MG tablet     vitamin D3 (CHOLECALCIFEROL) 50 mcg (2000 units) tablet     No current facility-administered medications for this visit.       Allergies   Allergen Reactions     Pcn [Penicillins] Unknown     Happened as child, states \"was in the hospital\"       Social History     Socioeconomic History     Marital status: Single   Tobacco Use     Smoking status: Every Day     Packs/day: 0.50     Years: 50.00     Additional pack years: 0.00     Total pack years: 25.00     Types: Cigarettes     Smokeless tobacco: Never   Vaping Use     Vaping Use: Never used   Substance and Sexual Activity     Alcohol use: Yes     Alcohol/week: 35.0 standard drinks of alcohol     Types: 42 Cans of beer per week     Comment: used  heavily - 3 beers/drink once a week since 1/19/2019     Drug use: Yes     Types: Marijuana     Comment: daily use     Sexual activity: Not Currently     Partners: Female     Social Determinants of Health     Financial Resource Strain: Low Risk  (10/6/2023)    Financial Resource Strain      Within the past 12 months, have you or your family members you live with been unable to get utilities (heat, electricity) when it was really needed?: No   Food Insecurity: High Risk (10/6/2023)    Food Insecurity      Within the past 12 months, did you worry that your food would run out before you got money to buy more?: Yes      Within the past 12 months, did the food you bought just not last and you didn t have money to get more?: Yes "   Transportation Needs: High Risk (10/6/2023)    Transportation Needs      Within the past 12 months, has lack of transportation kept you from medical appointments, getting your medicines, non-medical meetings or appointments, work, or from getting things that you need?: Yes   Interpersonal Safety: Low Risk  (10/6/2023)    Interpersonal Safety      Do you feel physically and emotionally safe where you currently live?: Yes      Within the past 12 months, have you been hit, slapped, kicked or otherwise physically hurt by someone?: No      Within the past 12 months, have you been humiliated or emotionally abused in other ways by your partner or ex-partner?: No   Housing Stability: High Risk (10/6/2023)    Housing Stability      Do you have housing? : Yes      Are you worried about losing your housing?: Yes       Family History   Problem Relation Age of Onset     Cancer Mother         colon -  75     Cancer Father         brain tumor     Cancer Sister         female cancer     Unknown/Adopted Sister      Unknown/Adopted Sister      Unknown/Adopted Sister      Unknown/Adopted Maternal Grandmother      Unknown/Adopted Maternal Grandfather      Unknown/Adopted Paternal Grandmother      Unknown/Adopted Paternal Grandfather      No Known Problems Son      No Known Problems Son          ROS: 10 point ROS neg other than the symptoms noted above in the HPI.    Vital Signs:   /84   Pulse 70   Temp 98.4  F (36.9  C) (Temporal)   Resp 18   Wt 73.9 kg (163 lb)   BMI 23.79 kg/m        Examination:  Constitutional:  Alert, well nourished, NAD.  Memory: recent and remote memory   HEENT: Normocephalic, atraumatic.   Pulm:  Without shortness of breath   CV:  No pitting edema of BLE.      Neurological:  Awake  Alert  Appropriate  Speech clear    Motor exam:   Hip Flexion:                 Right: 5/5  Left:  5/5  Knee flexion:               Right:  5/5  Left:  5/5  Knee extension:          Right:  5/5  Left:  5/5  Plantar  Flexion:           Right:  5/5  Left:  5/5  Dorsal Flexion:            Right:  4+/5  Left:  5/5  EHL:                            Right:  4+/5  Left:  5/5     Sensation normal to bilateral lower extremities  Muscle tone to bilateral lower extremities   Gait: Able to stand from a seated position.  Ambulates with cane, favors left leg.  Straight leg raise negative bilaterally.  Clonus negative bilaterally.    When asked where patient's pain is, patient points to right lower lumbar spine and right SI joint.  Mildly tender over right SI joint.    Negative SI joint compression test bilaterally.        Imaging:   Imaging Interpretation provided by radiologist.   EXAM: MR LUMBAR SPINE WITHOUT CONTRAST  1/4/2024 10:28 AM      HISTORY: Severe low back pain with right lower extremity radicular  pain; Lumbar radiculopathy.        COMPARISON: MRI 7/25/2019     TECHNIQUE: Multiplanar, multisequence MR images of the lumbar spine  were obtained without intravenous contrast..     CONTRAST: None.     FINDINGS:  Motion degraded sagittal and axial images. There are 5 lumbar type  vertebrae, used for the purposes of this dictation. No signal changes  suggesting acute fracture or traumatic subluxation. No suspicious  marrow lesion. Conus tip at approximately L1. Normal cauda equina.  Nonfocal extraspinal structures.      On a level by level basis, the findings are as follows:     L1-L2: Preserved disc height and signal for patient age. Normal facets  for patient age. No significant spinal canal or neural foraminal  stenosis.     L2-L3: Stable mild loss of disc height, intradiscal T2 signal. Stable  symmetric disc bulge and right facet arthropathy causing mild right  neural foraminal stenosis. No high-grade spinal canal or left neural  foraminal stenosis.     L3-L4: Stable mild loss of disc height, intradiscal T2 signal. Stable  posterior disc osteophyte complex and bilateral facet arthropathy  right greater than left causing moderate to  severe right and  mild-to-moderate left neural foraminal stenosis. No high-grade spinal  canal stenosis.     L4-L5: Stable mild loss of disc height, intradiscal T2 signal. Stable  posterior disc osteophyte complex and right greater than left facet  arthropathy causing moderate to severe right and mild to moderate left  neural foraminal stenosis. No high-grade spinal canal stenosis.     L5-S1: Stable mild loss of disc height, intradiscal T2 signal. Mild  right opposing endplate fibrovascular degenerative signal. Stable  posterior disc osteophyte complex and bilateral facet arthropathy  right greater than left causing severe right and moderate left neural  foraminal stenosis. No high-grade spinal canal stenosis.                                                                      IMPRESSION:   1.  No significant progression of multilevel lumbar spondylosis, as  detailed, since comparison MRI performed on 7/25/2019.  2.  No evidence of acute bone or soft tissue injury of the lumbar  spine.       Examination:  XR PELVIS AND HIP RIGHT 1 VIEW  Date:  10/6/2023 2:38 PM   Clinical Information: Right hip pain.     Comparison: none.                                            Impression:   1.  Normal joint alignment. No fracture or bone lesion. Maintained  joint spacing in both hips. Mild-moderate degenerative arthritic  spurring in both hips, left greater than right. Moderate-advanced  hypertrophic changes in the lower lumbar spine.      Assessment/Plan:   Iftikhar DOVE is a 64 year old male with acute on chronic right lumbar radiculopathy. Patient has been evaluated multiple times in the Emergency Department for the same symptoms, most recently on 12/22/23 with right low back pain radiating to right hip, anterior thigh, knee, and anterior lower leg without associated paresthesias.  Patient was prescribed Tizanidine and Medrol Dosepak with temporary improvement of symptoms.  Patient is currently using Tylenol to manage  symptoms due to running out of medications prescribed by ED.  Patient has completed physical therapy in the past for his back approximately 2 years ago,  no recent PT.  No history of spine surgery or injections.  Lumbar MRI reviewed and discussed with patient.  Lumbar MRI does note multiple levels of moderate to severe right foraminal stenosis at L3-L4, L4-L5 and L5-S1.    Plan will be to have patient start physical therapy for lower back pain and right radiculopathy.    Will also refer patient to Dr. Longoria for injection.   Will prescribe patient 1 refill of Tizanidine, advised patient if he needs further prescription refills to contact his PCP.  Patient can continue using Tylenol as needed for pain.    Patient verbalized understanding and is in agreement with the plan.       Patient Instructions   Placed order for 1 refill of tizanidine, if you need further prescriptions please contact your primary care provider.    Placed order for lumbar injection with Dr. Longoria, schedulers will contact you in the next few days to schedule the appointment.    Placed order for physical therapy for lower back pain and right lower extremity pain, schedulers will contact you the next few days to schedule the appointment.    Can continue using Tylenol as needed for pain control.    Celina Benitez PA-C  [facility] Neurosurgery  [place] [address]        Again, thank you for allowing me to participate in the care of your patient.        Sincerely,        Celina Benitez PA-C

## 2024-01-25 ENCOUNTER — TELEPHONE (OUTPATIENT)
Dept: FAMILY MEDICINE | Facility: CLINIC | Age: 64
End: 2024-01-25
Payer: COMMERCIAL

## 2024-01-25 NOTE — TELEPHONE ENCOUNTER
Sonny calling to follow up on MRI results. Noted that Puma had ordered.   Noted patient was seen by Neurosurgery on Monday, 1/22/24. Assessment and plan as noted, orders placed for physical therapy and referral to Dr. Longoria for an injection.    Sonny voicing frustration as she feels the therapy is not going to help and voiced that she feels he needs surgery.     Did inform that therapy would be calling to schedule the appointment.  Informed that scheduling for Dr. Longoria would be calling to get patient scheduled for the injection. Vonnie Britton LPN

## 2024-02-12 ENCOUNTER — PATIENT OUTREACH (OUTPATIENT)
Dept: CARE COORDINATION | Facility: CLINIC | Age: 64
End: 2024-02-12
Payer: COMMERCIAL

## 2024-02-12 DIAGNOSIS — Z71.89 OTHER SPECIFIED COUNSELING: Primary | Chronic | ICD-10-CM

## 2024-02-12 NOTE — PROGRESS NOTES
Clinic Care Coordination Contact  [facility]/Voicemail    Clinical Data: Care Coordinator Outreach    Outreach Documentation Number of Outreach Attempt   2/12/2024   3:29 PM 1     Left message on patient's voicemail with call back information and requested return call.    Plan: Care Coordinator will try to reach patient again in 1-2 business days.    Debbi Muñiz  Community Health Worker  [facility] Transitions Program  [facility]  [email or web address]  Office: [phone]

## 2024-02-21 NOTE — PROGRESS NOTES
Connected Care Resource Center Contact  [facility]/Voicemail     Clinical Data: Care Coordination ED-sourced Outreach-     Outreach attempted x 2.  Unable to leave message on patient's  voicemail. Patient can call [facility]'s 24/7 scheduling and nurse triage phone number [phone] ([phone]) for questions/concerns and/or to schedule an appt with an [facility] provider.      [facility] unable to send Care Coordination introduction letter as patient does not have an active MyChart account. Clinic Care Coordination services remain available via referral if needed.    Plan: [facility] will do no further outreaches at this time.       MARIELA Anders  Connected Care Resource [place], [facility]    *Connected Care Resource Team does NOT follow patient ongoing. Referrals are identified based on internal discharge reports and the outreach is to ensure patient has an understanding of their discharge instructions.

## 2024-02-27 ENCOUNTER — HOSPITAL ENCOUNTER (EMERGENCY)
Facility: CLINIC | Age: 64
End: 2024-02-27
Payer: COMMERCIAL

## 2024-04-30 ENCOUNTER — VIRTUAL VISIT (OUTPATIENT)
Dept: FAMILY MEDICINE | Facility: CLINIC | Age: 64
End: 2024-04-30
Payer: COMMERCIAL

## 2024-04-30 DIAGNOSIS — M54.50 CHRONIC BILATERAL LOW BACK PAIN WITHOUT SCIATICA: Primary | ICD-10-CM

## 2024-04-30 DIAGNOSIS — Z12.11 SCREEN FOR COLON CANCER: ICD-10-CM

## 2024-04-30 DIAGNOSIS — F10.21 ALCOHOL DEPENDENCE IN REMISSION (H): ICD-10-CM

## 2024-04-30 DIAGNOSIS — N40.1 BENIGN PROSTATIC HYPERPLASIA WITH NOCTURIA: ICD-10-CM

## 2024-04-30 DIAGNOSIS — R35.1 BENIGN PROSTATIC HYPERPLASIA WITH NOCTURIA: ICD-10-CM

## 2024-04-30 DIAGNOSIS — F17.200 NICOTINE DEPENDENCE, UNCOMPLICATED, UNSPECIFIED NICOTINE PRODUCT TYPE: ICD-10-CM

## 2024-04-30 DIAGNOSIS — G89.29 CHRONIC PAIN OF RIGHT KNEE: ICD-10-CM

## 2024-04-30 DIAGNOSIS — M25.551 HIP PAIN, RIGHT: ICD-10-CM

## 2024-04-30 DIAGNOSIS — M25.561 CHRONIC PAIN OF RIGHT KNEE: ICD-10-CM

## 2024-04-30 DIAGNOSIS — I10 ESSENTIAL HYPERTENSION: ICD-10-CM

## 2024-04-30 DIAGNOSIS — G89.29 CHRONIC BILATERAL LOW BACK PAIN WITHOUT SCIATICA: Primary | ICD-10-CM

## 2024-04-30 PROBLEM — F10.20 ALCOHOL DEPENDENCE (H): Status: ACTIVE | Noted: 2024-04-30

## 2024-04-30 PROCEDURE — 99215 OFFICE O/P EST HI 40 MIN: CPT | Mod: 95 | Performed by: FAMILY MEDICINE

## 2024-04-30 RX ORDER — METHOCARBAMOL 500 MG/1
500 TABLET, FILM COATED ORAL 2 TIMES DAILY PRN
Qty: 60 TABLET | Refills: 2 | Status: SHIPPED | OUTPATIENT
Start: 2024-04-30

## 2024-04-30 RX ORDER — MELOXICAM 15 MG/1
15 TABLET ORAL DAILY
Qty: 90 TABLET | Refills: 1 | Status: SHIPPED | OUTPATIENT
Start: 2024-04-30

## 2024-04-30 RX ORDER — TAMSULOSIN HYDROCHLORIDE 0.4 MG/1
0.4 CAPSULE ORAL DAILY
Qty: 90 CAPSULE | Refills: 1 | Status: SHIPPED | OUTPATIENT
Start: 2024-04-30

## 2024-04-30 RX ORDER — ATENOLOL 50 MG/1
50 TABLET ORAL DAILY
Qty: 90 TABLET | Refills: 1 | Status: SHIPPED | OUTPATIENT
Start: 2024-04-30

## 2024-04-30 NOTE — PROGRESS NOTES
"Iftikhar is a 64 year old who is being evaluated via a billable video visit.    How would you like to obtain your AVS? Mail a copy  If the video visit is dropped, the invitation should be resent by: Text to cell phone: [phone]  Will anyone else be joining your video visit? No          Instructions Relayed to Patient by Virtual Roomer:     If patient is not active on [employer]  Relayed the following to patient: \"Would you like us to text or email you a link to join your appointment now or when your provider is ready to initiate the virtual visit?\"      Patient Confirmed they will join visit via: Text Link to Cell Phone  Reminded patient to ensure they were logged on to virtual visit by arrival time listed.   Asked if patient has flexibility to initiate visit sooner than arrival time: patient is unable to initiate visit earlier than arrival time     If pediatric virtual visit, ensured pediatric patient along with parent/guardian will be present for video visit.     Patient offered the website [email or web address] and/or phone number to [employer] Help line: [phone]     Assessment & Plan     (M54.50,  G89.29) Chronic bilateral low back pain without sciatica  (primary encounter diagnosis)  (M25.561,  G89.29) Chronic pain of right knee  (M25.551) Hip pain, right  Comment: Been having the chronic back, knee and hip pain for years.  He was seen on October 2023 and please see my last dictation for further details.  The pain has gotten worse and has affected his ability to walk as well as daily activity.  Stated that he is not able to take care of himself.  Not able to walk more than a block or standing more than 5-10 minutes without having severe pain that need to sit and rest.  Feels weak and stable, been using the cane and does not feel it is adequate enough.  He requested PCA and a wheelchair.  He plans to use the wheelchair only as needed for long distance walk.  He has a seated walker and it is not " adequate.  No fever.  No problem with controlling his bowel movement and urination.  This is a chronic condition that he has for years that has gotten worse gradually over the years.    MRI in 2019 showed multilevel degenerative changes of the lumbar spine with most pronounced finding at L4-L5 and L5-S1 with bulging disc and annular fissure; severe right and moderate to severe left facet arthropathy as well as mild to moderate left neuroforaminal stenosis.  He did not tolerate the muscle spasm in the past.  Not interested in physical therapy as it worsened his symptoms in the past.  Not sure how he responded to the Mobic but there was no side effect from it - ran out for awhile.  No history of GI bleeding.  Has a history of alcoholism but has stopped drinking for years.    Clinical presentation did not suggest equina syndrome nor infection.  Unfortunately, not able to perform physical exam today due to virtual visit.    I recommend him to follow-up in person for further evaluation and treatment options.  Likely will need to reimaging his spine MRI.  In the meantime, encouraged him to continue his normal activities tolerated.  Emphasized on fall prevention.  Will get the wheelchair for him but he was encouraged to use only as needed.  Recommend to continue with the seated walker when not using the wheelchair    Will also request the home health aide, home PT and perhaps .  Home health aide will help him with daily activity include bathing or cleaning and cooking.  PT to eval and work on his strength and instability as well as evaluated for his home safety.  Social service to help with arranging for perhaps PCA.  He does not drive and typically has significant discomfort to get in and out of the car    Also discussed about steroid injection treatment option - orthopedic for hip and knee pain if you are interested.  Also informed that he may come into the office and I can do the knee injection for him  for the knee pain.  He declined both.  He also declined neurosurgery consultation for his back problem.  Not interest in surgical or more invasive intervention.    Restarted him on the Mobic daily as needed.  Encouraged him to take it with food only.  Will also have him try the low-dose of Robaxin.  Potential side effect associate with these medications discussed.    Symptoms that need to be seen to call and discuss.  He felt comfortable with the plan and all of his questions were answered.    Plan: meloxicam (MOBIC) 15 MG tablet, methocarbamol         (ROBAXIN) 500 MG tablet            (I10) Essential hypertension  Comment: Controlled with Atenolol. No history of high cholesterol, DM, CAD, CAV or PAD. The goal is for his blood pressure is 140s/90s; avoiding lower blood pressure due to high risk for falling. He typically does not like to come to the clinic for follow-up nor compliant with lab work. Will continue to avoid ACE or diuretic. Encouraged to avoid high salt intake.  Blood pressure couple months ago was normal with a heart rate of 70.  We need to keep an eye on his heart rate as he has had problems with bradycardia in the past.  If needed, will switch to amlodipine in the future    Plan: atenolol (TENORMIN) 50 MG tablet            (N40.1,  R35.1) Benign prostatic hyperplasia with nocturia  Comment: With elevated PSA - never follow-up with urologist by choice.  He also displays significant symptoms of benign prostate hypertrophy with urinary frequency and nocturia.  Declined him checking the PSA level at the last visit.  The Flomax has been helping.  Will continue with the Flomax which was refilled today.    Plan: tamsulosin (FLOMAX) 0.4 MG capsule            (F10.21) Alcohol dependence in remission (H)  Comment: He has a long history of alcoholism but has been in remission in the several couple years.  No longer drinking, no craving.  Encouraged him to keep the good work with sobering.       (F17.200)  Nicotine dependence, uncomplicated, unspecified nicotine product type  Comment: Iftikhar has been smoking for years.  Discussed with him about the long and short term consequences of tobacco smoking.  He is was strongly encouraged to stop smoking.  Discussed with him about different options for smoking cessation aids.  He is not ready to quit smoking at this time.  Encourage to let me know when he is ready to quit.  In a mean time, I encourage him to reduce to amount of cigarrets smoke as much as possible.  All of his questions were answered.     Plan: SMOKING CESSATION COUNSELING 3-10 MIN            (Z12.11) Screen for colon cancer  Comment: Never had colon cancer finding by choice.  Cologuard was ordered at the last visit but never sent in the sample.  Discussed with him about the importance of detecting early colon cancer which can be curable.  Discussed with him about options for colon cancer screening which include colonoscopy, Cologuard or FIT test.  Pros and cons of each option discussed.  He preferred Cologuard and is planning to send it in this time.  He was informed that that positive will require further evaluation, including colonoscopy.  He was also informed that normal Cologuard is recommended to be repeated every 3 years.     Plan: COLOGUARD([employer])            40 minutes spent by me on the date of the encounter doing chart review, history and exam, documentation and further activities per the note        Subjective   Iftikhar is a 64 year old, presenting for the following health issues:  Refill Request and Arthritis (Knee and hip)      4/30/2024     2:33 PM   Additional Questions   Roomed by Madelyn         4/30/2024     2:33 PM   Patient Reported Additional Medications   Patient reports taking the following new medications tylenol     Video Start Time:  3:28 PM    Arthritis    History of Present Illness       Reason for visit:  Recheck medication and refillls, also discuss cortisone shots for hip  and knee    He eats 0-1 servings of fruits and vegetables daily.He consumes 3 sweetened beverage(s) daily.He exercises with enough effort to increase his heart rate 10 to 19 minutes per day.  He exercises with enough effort to increase his heart rate 3 or less days per week.   He is taking medications regularly.     Patient states that they were spoken to previously about getting cortisone shots in hip and knee and would like to revisit this. Patient also needs medication refills but is unsure on which medications.     Iftikhar was seen today for couple concerns.  First is to follow-up on his chronic pain syndrome.  Been having the chronic back, knee and hip pain for years.  He was seen on October 2023 and please see my last dictation for further details.  The pain has gotten worse and has affected his ability to walk as well as daily activity.  Stated that he is not able to take care of himself.  Not able to walk more than a block or standing more than 5-10 minutes without having severe pain that need to sit and rest.  Feels weak and stable, been using the cane and does not feel it is adequate enough.  He requested PCA and a wheelchair.  He plans to use the wheelchair only as needed for long distance walk.  He has a seated walker and it is not adequate.  No fever.  No problem with controlling his bowel movement and urination.  This is a chronic condition that he has for years that has gotten worse gradually over the years.  He is homebound, does not drive and has significant discomfort/pain to get in and out of the car, requiring assistance from family members.    Also has blood pressure.  Been taking atenolol with no side effect.  Not checking his blood pressure at home.  No headache or dizziness.  No leg swelling, orthopnea or dyspnea.  No chest pain or shortness of breath.    Flomax has been effective for his prostate problem.  Been running out of the Flomax, noted significant difference.  Would like to restart  it.  No side effect.    Otherwise doing well.  Lives with family member.  Feels safe.  Been in remission for alcoholism for several years.  Continue to smoke about 1/2 ppd.  Not ready to quit smoking at this time.  No other concern.        Review of Systems  Constitutional, HEENT, cardiovascular, pulmonary, gi and gu systems are negative, except as otherwise noted.      Objective           Vitals:  No vitals were obtained today due to virtual visit.    Physical Exam   GENERAL: alert and no distress, speaking in full sentences.  EYES: Eyes grossly normal to inspection.  No discharge or erythema, or obvious scleral/conjunctival abnormalities.  RESP: No audible wheeze, cough, or visible cyanosis.  No labored breathing.  SKIN: Visible skin clear. No significant rash, abnormal pigmentation or lesions.  NEURO: Cranial nerves grossly intact.  Mentation and speech appropriate for age.  PSYCH: Appropriate affect, tone, and pace of words    No results found for any visits on 04/30/24.      Video-Visit Details    Type of service:  Video Visit   Video End Time: 3:47 PM  Originating Location (pt. Location): Home    Distant Location (provider location):  On-site  Platform used for Video Visit: Shama  Signed Electronically by: Blaise Chance Mai, MD

## 2024-04-30 NOTE — Clinical Note
Please let patient know that I refer him to home physical therapy, home health aide, and social service.  Home health aide will help him with daily activity.  Physical therapy will help with strengthening and stability.  Social service will look into option of PCA versus home health aide.  He will hear from them soon.  I also sent in a prescription for the wheelchair.  Thank you

## 2024-05-01 ENCOUNTER — ORDERS ONLY (AUTO-RELEASED) (OUTPATIENT)
Dept: FAMILY MEDICINE | Facility: CLINIC | Age: 64
End: 2024-05-01
Payer: COMMERCIAL

## 2024-05-01 DIAGNOSIS — Z12.11 SCREEN FOR COLON CANCER: ICD-10-CM

## 2024-05-01 PROBLEM — F10.20 ALCOHOL DEPENDENCE (H): Status: RESOLVED | Noted: 2024-04-30 | Resolved: 2024-05-01

## 2024-05-01 NOTE — PATIENT INSTRUCTIONS
Quitting Tobacco: Care Instructions  Quitting tobacco is much harder than simply changing a habit. Nicotine cravings make it hard to quit, but you can do it. Your doctor will help you set up the plan that best meets your needs.    You will miss the nicotine at first. You may feel short-tempered and grumpy. You may have trouble sleeping or thinking clearly. The urge to use tobacco may continue for a time.    Combining tools can raise your chances of success. You can use medicine along with counseling. And you can join a quit-tobacco program, such as the American Lung Association's Freedom from Smoking program.    Get support.  Reach out to family and friends, and find a counselor to help you quit. Join a support group, such as Nicotine Anonymous. Go to www.smokefree.gov to sign up for text messaging support.     Talk to your doctor or pharmacist about medicines that can help you quit.  Medicines can help with cravings and withdrawal symptoms. There are several over-the-counter choices, such as nicotine patches, gum, and lozenges.     After you quit, do not use tobacco again, not even once.  Get rid of all tobacco products and anything that reminds you of tobacco, such as ashtrays.     Avoid things that make you reach for tobacco.  Change your daily routine. Take a different route to work, or eat a meal in a different place.     Try to cut down on stress.  Find ways to calm yourself, such as taking a hot bath or doing deep breathing exercises.     Eat a healthy diet, and get regular exercise.  Having healthy habits may help you quit using tobacco.     Don't give up on quitting if you use tobacco again.  Most people quit and restart a few times before they quit for good.   Follow-up care is a key part of your treatment and safety. Be sure to make and go to all appointments, and call your doctor if you are having problems. It's also a good idea to know your test results and keep a list of the medicines you take.  Where  "can you learn more?  Go to https://www.[employer].net/patiented  Enter Y522 in the search box to learn more about \"Quitting Tobacco: Care Instructions.\"  Current as of: November 15, 2023               Content Version: 14.0    7083-9993 [employer].   Care instructions adapted under license by your healthcare professional. If you have questions about a medical condition or this instruction, always ask your healthcare professional. [employer] disclaims any warranty or liability for your use of this information.      Deciding About Using Medicines To Quit Smoking  How can you decide about using medicines to quit smoking?  What are the medicines you can use?  Your doctor may prescribe varenicline (Chantix) or bupropion SR. These medicines can help you cope with cravings for tobacco. They are pills that don't contain nicotine.  You also can use nicotine replacement products. These do contain nicotine. There are many types.  Gum and lozenges slowly release nicotine into your mouth.  Patches stick to your skin. They slowly release nicotine into your bloodstream.  An inhaler has a aparicio that contains nicotine. You breathe in a puff of nicotine vapor through your mouth and throat.  Nasal spray releases a mist that contains nicotine.  What are key points about this decision?  Using medicines can increase your chances of quitting smoking. They can ease cravings and withdrawal symptoms.  Getting counseling along with using medicine can raise your chances of quitting even more.  These nicotine replacement products have less nicotine than cigarettes. And by itself, nicotine is not nearly as harmful as smoking. The tars, carbon monoxide, and other toxic chemicals in tobacco cause the harmful effects.  The side effects of nicotine replacement products depend on the type of product. For example, a patch can make your skin red and itchy. Medicines in pill form can make you sick to your stomach. They can " "also cause dry mouth and trouble sleeping. For most people, the side effects are not bad enough to make them stop using the products.  Why might you choose to use medicines to quit smoking?  You have tried on your own to stop smoking, but you were not able to stop.  You want to increase your chances of quitting smoking.  You want to reduce your cravings and withdrawal symptoms.  You feel the benefits of medicine outweigh the side effects.  Why might you choose not to use medicine?  You want to try quitting on your own by stopping all at once (\"cold turkey\").  You want to cut back slowly on the number of cigarettes you smoke.  You do not like using medicine.  You feel the side effects of medicines outweigh the benefits.  You are worried about the cost of medicines.  Your decision  Thinking about the facts and your feelings can help you make a decision that is right for you. Be sure you understand the benefits and risks of your options, and think about what else you need to do before you make the decision.  Where can you learn more?  Go to https://www.[employer].net/patiented  Enter K933 in the search box to learn more about \"Deciding About Using Medicines To Quit Smoking.\"  Current as of: November 15, 2023               Content Version: 14.0    7717-7664 [employer]roll[employer]to).   Care instructions adapted under license by your healthcare professional. If you have questions about a medical condition or this instruction, always ask your healthcare professional. [employer] (roll.[employer]) disclaims any warranty or liability for your use of this information.      Learning About Benefits of Quitting Smoking  Quitting smoking helps your body in many ways. Quitting lowers your risk for cancer, lung disease, heart attack, stroke, blood vessel disease, and blindness. You'll also get sick less often and heal faster. And after you quit, you may find that your mood is better and you are less stressed.  When and how will you feel " "healthier after quitting smoking?    In the first hours or days:    Your blood pressure and heart rate go down.  Your oxygen levels increase.    Within weeks or months:    You will cough less and breathe deeper. It may be easier to be active.  Your sense of taste and smell should return.    Over the years:    Your risks of heart disease, heart attack, and stroke are lower.  Your risk of lung cancer is cut by about half after about 10 years. And your risk for other cancers is lower too.  How would quitting help others in your life?    Their heart, lung, and cancer risks may drop, much like yours. They will also be sick less.   If you are or will be pregnant someday, quitting smoking means a healthier .   Where can you learn more?  Go to https://www.[employer].net/patiented  Enter O319 in the search box to learn more about \"Learning About Benefits of Quitting Smoking.\"  Current as of: November 15, 2023               Content Version: 14.0    5262-3614 [employer].   Care instructions adapted under license by your healthcare professional. If you have questions about a medical condition or this instruction, always ask your healthcare professional. [employer] disclaims any warranty or liability for your use of this information.      "

## 2024-05-03 ENCOUNTER — PATIENT OUTREACH (OUTPATIENT)
Dept: CARE COORDINATION | Facility: CLINIC | Age: 64
End: 2024-05-03
Payer: COMMERCIAL

## 2024-05-03 NOTE — PROGRESS NOTES
Clinic Care Coordination Contact  [facility]/Voicemail    Clinical Data: Care Coordinator Outreach    Outreach Documentation Number of Outreach Attempt   5/3/2024  11:38 AM 1       Left message on patient's voicemail with call back information and requested return call.    Plan: Care Coordinator will try to reach patient again in 1-2 business days.    MARIELA Shine  [phone]  [facility]

## 2024-05-03 NOTE — LETTER
[facility] CARE COORDINATION  [address]   [place] [address]    May 6, 2024    Iftikhar DOVE  [address] [address]      Dear Iftikhar,    I am a clinic community health worker who works with Blaise Chance Mai, MD with the [facility]. I have been trying to reach you recently to introduce Clinic Care Coordination. Below is a description of clinic care coordination and how we can further assist you.       The clinic care coordination team is made up of a registered nurse, , financial resource worker and community health worker who understand the health care system. The goal of clinic care coordination is to help you manage your health and improve access to the health care system. Our team works alongside your provider to assist you in determining your health and social needs. We can help you obtain health care and community resources, providing you with necessary information and education. We can work with you through any barriers and develop a care plan that helps coordinate and strengthen the communication between you and your care team.  Our services are voluntary and are offered without charge to you personally.    Please feel free to contact me with any questions or concerns regarding care coordination and what we can offer.      We are focused on providing you with the highest-quality healthcare experience possible.    Sincerely,     MARIELA Shine  [phone]  [facility]  [facility]

## 2024-05-06 NOTE — PROGRESS NOTES
Clinic Care Coordination Contact  [facility]/Voicemail    Clinical Data: Care Coordinator Outreach    Outreach Documentation Number of Outreach Attempt   5/3/2024  11:38 AM 1   5/6/2024   3:19 PM 2       Left message on patient's voicemail with call back information and requested return call.    Plan: Care Coordinator will send care coordination introduction letter with care coordinator contact information and explanation of care coordination services via erentohart. Care Coordinator will do no further outreaches at this time.    MARIELA Shine  [phone]  [facility] Care Resource [facility]

## 2024-06-11 NOTE — TELEPHONE ENCOUNTER
Tatiana cannot reorder medications, especially narcotics that are 6 months old.  He would need a visit to discuss his pain

## 2024-06-12 NOTE — TELEPHONE ENCOUNTER
Left message for patient to return call. Will route to RNs to remove the medication   Katharine Cosby MA 6/12/2024

## 2024-06-13 RX ORDER — TRAMADOL HYDROCHLORIDE 50 MG/1
50 TABLET ORAL EVERY 6 HOURS PRN
Qty: 20 TABLET | Refills: 0 | OUTPATIENT
Start: 2024-06-13

## 2024-06-13 NOTE — TELEPHONE ENCOUNTER
"Had routed back to provider to refuse controlled substance. RN has since clarified that we can refuse controlled if provider states they will not fill.     Refused med, per chart review:  \"Anthony Cadena MD   to [facility]    6/11/24  4:52 PM  Note  Sorry cannot reorder medications, especially narcotics that are 6 months old.  He would need a visit to discuss his pain      \"    Junior Larsen RN on 6/13/2024 at 3:06 PM    "

## 2024-06-13 NOTE — TELEPHONE ENCOUNTER
This never should have went to another provider this was left a message for the patient to call back to schedule an appt. And I routed to the RNS to delete the medication as we MAS can't. When a doctor denies the medication but doesn't actually deny it. It stays in the encounter and we can't close it until the medication is signed or denied.

## 2024-06-13 NOTE — TELEPHONE ENCOUNTER
See note from Surinder below - unclear why this was routed to me. He does have upcoming with with Dr. Cox - I do not see this medication was addressed in their last OV note.     Siomara Roque PA-C

## 2024-10-02 ENCOUNTER — HOSPITAL ENCOUNTER (EMERGENCY)
Facility: CLINIC | Age: 64
Discharge: HOME | End: 2024-10-02
Attending: EMERGENCY MEDICINE | Admitting: EMERGENCY MEDICINE
Payer: COMMERCIAL

## 2024-10-02 ENCOUNTER — TELEPHONE (OUTPATIENT)
Dept: FAMILY MEDICINE | Facility: CLINIC | Age: 64
End: 2024-10-02

## 2024-10-02 VITALS
WEIGHT: 172 LBS | SYSTOLIC BLOOD PRESSURE: 163 MMHG | HEART RATE: 83 BPM | DIASTOLIC BLOOD PRESSURE: 86 MMHG | RESPIRATION RATE: 18 BRPM | BODY MASS INDEX: 25.48 KG/M2 | TEMPERATURE: 97.8 F | HEIGHT: 69 IN | OXYGEN SATURATION: 97 %

## 2024-10-02 DIAGNOSIS — N39.0 URINARY TRACT INFECTION IN MALE: ICD-10-CM

## 2024-10-02 DIAGNOSIS — R29.6 FREQUENT FALLS: ICD-10-CM

## 2024-10-02 DIAGNOSIS — N32.0 BLADDER OUTFLOW OBSTRUCTION: ICD-10-CM

## 2024-10-02 LAB
ALBUMIN UR-MCNC: 30 MG/DL
APPEARANCE UR: ABNORMAL
BACTERIA #/AREA URNS HPF: ABNORMAL /HPF
BILIRUB UR QL STRIP: NEGATIVE
COLOR UR AUTO: YELLOW
GLUCOSE UR STRIP-MCNC: NEGATIVE MG/DL
HGB UR QL STRIP: ABNORMAL
KETONES UR STRIP-MCNC: NEGATIVE MG/DL
LEUKOCYTE ESTERASE UR QL STRIP: ABNORMAL
NITRATE UR QL: NEGATIVE
PH UR STRIP: 6 [PH] (ref 5–7)
RBC URINE: 4 /HPF
SP GR UR STRIP: 1.01 (ref 1–1.03)
UROBILINOGEN UR STRIP-MCNC: NORMAL MG/DL
WBC CLUMPS #/AREA URNS HPF: PRESENT /HPF
WBC URINE: >182 /HPF

## 2024-10-02 PROCEDURE — 87186 SC STD MICRODIL/AGAR DIL: CPT | Performed by: EMERGENCY MEDICINE

## 2024-10-02 PROCEDURE — 99284 EMERGENCY DEPT VISIT MOD MDM: CPT | Mod: 25 | Performed by: EMERGENCY MEDICINE

## 2024-10-02 PROCEDURE — 81001 URINALYSIS AUTO W/SCOPE: CPT | Performed by: EMERGENCY MEDICINE

## 2024-10-02 PROCEDURE — 51798 US URINE CAPACITY MEASURE: CPT | Performed by: EMERGENCY MEDICINE

## 2024-10-02 PROCEDURE — 99284 EMERGENCY DEPT VISIT MOD MDM: CPT | Performed by: EMERGENCY MEDICINE

## 2024-10-02 RX ORDER — CEPHALEXIN 500 MG/1
500 CAPSULE ORAL 3 TIMES DAILY
Qty: 21 CAPSULE | Refills: 0 | Status: SHIPPED | OUTPATIENT
Start: 2024-10-02 | End: 2024-10-05

## 2024-10-02 ASSESSMENT — COLUMBIA-SUICIDE SEVERITY RATING SCALE - C-SSRS
6. HAVE YOU EVER DONE ANYTHING, STARTED TO DO ANYTHING, OR PREPARED TO DO ANYTHING TO END YOUR LIFE?: NO
1. IN THE PAST MONTH, HAVE YOU WISHED YOU WERE DEAD OR WISHED YOU COULD GO TO SLEEP AND NOT WAKE UP?: NO
2. HAVE YOU ACTUALLY HAD ANY THOUGHTS OF KILLING YOURSELF IN THE PAST MONTH?: NO

## 2024-10-02 ASSESSMENT — ACTIVITIES OF DAILY LIVING (ADL)
ADLS_ACUITY_SCORE: 35
ADLS_ACUITY_SCORE: 35

## 2024-10-02 NOTE — ED PROVIDER NOTES
"  History     Chief Complaint   Patient presents with    Urinary Retention     HPI  Iftikhar DOVE is a 64 year old male who presents with concerns for acute urinary tension.  Brought in by 2 relatives.  Also concerned about more frequent falls at home and feel that he should have an assessment at home for his safety.  Patient has had bladder outflow difficulties in the past.  Has been on Flomax.  Now has not been able to urinate.  Just occasional small dribbles.  Complaining of painful distended lower abdomen.    Allergies:  Allergies   Allergen Reactions    Pcn [Penicillins] Unknown     Happened as child, states \"was in the hospital\"       Problem List:    Patient Active Problem List    Diagnosis Date Noted    Tobacco use disorder 2021     Priority: Medium    Alcohol dependence in remission (H) 2021     Priority: Medium    Elevated prostate specific antigen (PSA) 2019     Priority: Medium    Vitamin D deficiency 2019     Priority: Medium    Umbilical hernia without obstruction and without gangrene 2019     Priority: Medium    Chronic bilateral low back pain without sciatica 2019     Priority: Medium    Essential hypertension 2019     Priority: Medium        Past Medical History:    Past Medical History:   Diagnosis Date    Alcohol dependence (H) 2021    Hypertension        Past Surgical History:    Past Surgical History:   Procedure Laterality Date    NO HISTORY OF SURGERY         Family History:    Family History   Problem Relation Age of Onset    Cancer Mother         colon -  75    Cancer Father         brain tumor    Cancer Sister         female cancer    Unknown/Adopted Sister     Unknown/Adopted Sister     Unknown/Adopted Sister     Unknown/Adopted Maternal Grandmother     Unknown/Adopted Maternal Grandfather     Unknown/Adopted Paternal Grandmother     Unknown/Adopted Paternal Grandfather     No Known Problems Son     No Known Problems Son  " "      Social History:  Marital Status:  Single [1]  Social History     Tobacco Use    Smoking status: Every Day     Current packs/day: 0.50     Average packs/day: 0.5 packs/day for 50.0 years (25.0 ttl pk-yrs)     Types: Cigarettes    Smokeless tobacco: Never   Vaping Use    Vaping status: Never Used   Substance Use Topics    Alcohol use: Yes     Alcohol/week: 35.0 standard drinks of alcohol     Types: 42 Cans of beer per week     Comment: used  heavily - 3 beers/drink once a week since 1/19/2019    Drug use: Yes     Types: Marijuana     Comment: daily use        Medications:    atenolol (TENORMIN) 50 MG tablet  meloxicam (MOBIC) 15 MG tablet  methocarbamol (ROBAXIN) 500 MG tablet  tamsulosin (FLOMAX) 0.4 MG capsule  vitamin D3 (CHOLECALCIFEROL) 50 mcg (2000 units) tablet          Review of Systems   All other systems reviewed and are negative.    Acknowledges frequent falls.  Physical Exam   BP: (!) 150/84  Pulse: 83  Temp: 97.8  F (36.6  C)  Resp: 18  Height: 175.3 cm (5' 9\")  Weight: 78 kg (172 lb)  SpO2: 96 %      Physical Exam  Vitals and nursing note reviewed.   Musculoskeletal:      Comments: Bruising to the greater trochanter of the right hip but no pain with TORRES testing of the hip.    Abdomen is markedly distended with bladder palpable about 2 to 3 cm above the umbilicus.  Painful.    Small amount of t incontinence noted in his clothing.         ED Course        Procedures                    Assessments & Plan (with Medical Decision Making)  64-year-old male.  Present acute urinary tension with history for bladder outflow obstruction.  No longer benefiting from Flomax.  Presented with over 1400 cc of bladder retention.  Wilkes catheter hooked to leg bag.  Will be left in until he sees urology.  Urology consult requested electronically.  Family also reports she has had more frequent falls.  I did note that he had an abrasion and small contusion about the right hip.  Referral for  and physical " therapy in his home.  He is homebound.  Family believes that he might benefit from a wheelchair since he continues to fall using a walker/toilet seat elevator excetra.  Before discharge -urine was collected and with positive signs of active UTI treated with cephalexin.  UC pending.       I have reviewed the nursing notes.    I have reviewed the findings, diagnosis, plan and need for follow up with the patient.          New Prescriptions    No medications on file       Final diagnoses:   Bladder outflow obstruction   Frequent falls   Urinary tract infection in male       10/2/2024   [facility] EMERGENCY DEPT       Nik Gould,   10/02/24 3682

## 2024-10-02 NOTE — TELEPHONE ENCOUNTER
Stephanie RN with [facility] called in stating patient was seen in ED today at [facility], and they received orders for home care from the ED provider.     NASREEN Brown states they cannot provide services to the patient as they have no availability/staffing for his area right now. She is requesting we send a referral for home care elsewhere. She states the orders placed were for PT and social work, but based on why patient was seen she thinks nursing would be nice too.     Can we place a care coordination referral so they can help assist patient in finding home care? Referral pended for provider review.       Delmi Weeks, ABBEN, RN

## 2024-10-02 NOTE — ED TRIAGE NOTES
Pt presents with concern of unable to fully empty bladder over past few days.         
complains of pain/discomfort

## 2024-10-02 NOTE — TELEPHONE ENCOUNTER
Not sure why he was referred to home health.  Please follow up - with medicare, will need face to face exam

## 2024-10-02 NOTE — DISCHARGE INSTRUCTIONS
-A referral has been placed for at home assessment with  and physical therapist.  Requested by family to help see if there is improvements in the home through adaptive devices that can help with avoidance of falls.    -Referral for urology consultation.  They will call you for the appointment.  Requested in the next week.    -Keep the Wilkes catheter in.  Hooked up to a leg bag that will require drainage as it fills.    -Urine showed evidence for concerns of infection.  Placed on antibiotic therapy with cephalexin 500 mg 3 times daily for 7 days.

## 2024-10-03 ENCOUNTER — TELEPHONE (OUTPATIENT)
Dept: UROLOGY | Facility: CLINIC | Age: 64
End: 2024-10-03
Payer: COMMERCIAL

## 2024-10-03 NOTE — TELEPHONE ENCOUNTER
[facility] Call Center    Phone Message    May a detailed message be left on voicemail: yes     Reason for Call: Other: Patient being referred for Bladder outflow obstruction/EAN by referring provider. Spoke with Pt's niece, she states that the Pt has a catheter from the ER. Referral didn't mention anything about a catheter. Sending message for review and follow-up with Pt's niece to discuss next steps. Thank you!     Action Taken: PH Uro    Travel Screening: Not Applicable     Date of Service:

## 2024-10-04 LAB
BACTERIA UR CULT: ABNORMAL
BACTERIA UR CULT: ABNORMAL

## 2024-10-04 NOTE — CONFIDENTIAL NOTE
Writer called and left VM for patient to call back.  C2C on file with hermelindo Sumner. Left  for her to call back.  Offer an appt on 10.14.24 in .  Awaiting call back.    Radha CASILLAS RN Urology 10/4/2024 9:35 AM

## 2024-10-05 ENCOUNTER — TELEPHONE (OUTPATIENT)
Dept: NURSING | Facility: CLINIC | Age: 64
End: 2024-10-05
Payer: COMMERCIAL

## 2024-10-05 DIAGNOSIS — N39.0 URINARY TRACT INFECTION: Primary | ICD-10-CM

## 2024-10-05 RX ORDER — CIPROFLOXACIN 250 MG/1
250 TABLET, FILM COATED ORAL 2 TIMES DAILY
Qty: 10 TABLET | Refills: 0 | Status: SHIPPED | OUTPATIENT
Start: 2024-10-05 | End: 2024-10-10

## 2024-10-05 NOTE — TELEPHONE ENCOUNTER
[facility]    Reason for call: Lab Result Notification     Lab Result (including Rx patient on, if applicable).  If culture, copy of lab report at bottom.  Lab Result: urine culture  cephALEXin (KEFLEX) 500 MG capsule 3 times daily for 7 days.  Resistant 2   Creatinine Level (mg/dl)   Creatinine   Date Value Ref Range Status   12/26/2023 0.77 0.67 - 1.17 mg/dL Final   01/20/2021 0.80 0.66 - 1.25 mg/dL Final    Creatinine clearance (ml/min), if applicable    Serum creatinine: 0.77 mg/dL 12/26/23 1857  Estimated creatinine clearance: 104.1 mL/min     Patient's current Symptoms:   Spoke with niece Burak with [facility], Maik is still having abdominal discomfort. Reports no change but not getting worse. Denies fever, denies flank pain, he is drinking fluids and eating per usual. She is working on making appointments with PCP and urology.    RN Recommendations/Instructions per [place] ED lab result protocol:   Advise to discontinue current antibiotic.   Instruct to start antibiotic: [id number] po BID x 5 days.    Patient/care giver notified to contact your PCP clinic or return to the Emergency department if your:  Symptoms return.  Symptoms do not improve after 3 days on antibiotic.  Symptoms do not resolve after completing antibiotic.  Symptoms worsen or other concerning symptoms.    Stephanie Ngo RN

## 2024-10-09 NOTE — CONFIDENTIAL NOTE
Writer called and spoke with hermelindo Ferrara.    Appt made.    Radha CASILLAS RN   [facility], Urology   10/9/2024 9:14 AM

## 2024-10-14 ENCOUNTER — OFFICE VISIT (OUTPATIENT)
Dept: UROLOGY | Facility: CLINIC | Age: 64
End: 2024-10-14
Attending: EMERGENCY MEDICINE
Payer: COMMERCIAL

## 2024-10-14 ENCOUNTER — PREP FOR PROCEDURE (OUTPATIENT)
Dept: UROLOGY | Facility: CLINIC | Age: 64
End: 2024-10-14

## 2024-10-14 DIAGNOSIS — N40.1 BENIGN PROSTATIC HYPERPLASIA WITH URINARY RETENTION: Primary | ICD-10-CM

## 2024-10-14 DIAGNOSIS — N32.0 BLADDER OUTFLOW OBSTRUCTION: ICD-10-CM

## 2024-10-14 DIAGNOSIS — I10 ESSENTIAL HYPERTENSION: ICD-10-CM

## 2024-10-14 DIAGNOSIS — R33.8 BENIGN PROSTATIC HYPERPLASIA WITH URINARY RETENTION: Primary | ICD-10-CM

## 2024-10-14 DIAGNOSIS — R97.20 ELEVATED PROSTATE SPECIFIC ANTIGEN (PSA): ICD-10-CM

## 2024-10-14 PROBLEM — F10.20 ALCOHOL DEPENDENCE (H): Status: ACTIVE | Noted: 2024-10-14

## 2024-10-14 PROCEDURE — 99204 OFFICE O/P NEW MOD 45 MIN: CPT | Mod: 25 | Performed by: UROLOGY

## 2024-10-14 PROCEDURE — 52000 CYSTOURETHROSCOPY: CPT | Performed by: UROLOGY

## 2024-10-14 NOTE — PROGRESS NOTES
Patient arrives for catheter removal. 200 mL water instilled into the bladder through existing hodge catheter. Catheter removed without problem. Patient urinated 150cc water into urinal.

## 2024-10-14 NOTE — PROGRESS NOTES
"S: Iftikhar DOVE is a pleasant  64 year old male who was requested to be seen by  Blaise Cox for a consult with regard to patient's urinary complaints.  Patient complains of retention of urine recently.  He had hodge catheter placed with 1.4 liter of urine drained.  He has history of elevated PSA but no previous work up.  His urinary symptoms have been on going for   many years(s).  Seems to be worsened over time.  His recent PSA was found to be   PSA   Date Value Ref Range Status   2021 4.76 (H) 0 - 4 ug/L Final     Comment:     Assay Method:  Chemiluminescence using Siemens Vista analyzer   He is on flomax.    Current Outpatient Medications   Medication Sig Dispense Refill    atenolol (TENORMIN) 50 MG tablet Take 1 tablet (50 mg) by mouth daily Please stop the chlorthalidone 90 tablet 1    meloxicam (MOBIC) 15 MG tablet Take 1 tablet (15 mg) by mouth daily 90 tablet 1    methocarbamol (ROBAXIN) 500 MG tablet Take 1 tablet (500 mg) by mouth 2 times daily as needed for muscle spasms 60 tablet 2    tamsulosin (FLOMAX) 0.4 MG capsule Take 1 capsule (0.4 mg) by mouth daily 90 capsule 1    vitamin D3 (CHOLECALCIFEROL) 50 mcg (2000 units) tablet Take 1 tablet (50 mcg) by mouth daily 100 tablet 3     Allergies   Allergen Reactions    Pcn [Penicillins] Unknown     Happened as child, states \"was in the hospital\"     Past Medical History:   Diagnosis Date    Alcohol dependence (H) 2021    Hypertension      Past Surgical History:   Procedure Laterality Date    NO HISTORY OF SURGERY        Family History   Problem Relation Age of Onset    Cancer Mother         colon -  75    Cancer Father         brain tumor    Cancer Sister         female cancer    Unknown/Adopted Sister     Unknown/Adopted Sister     Unknown/Adopted Sister     Unknown/Adopted Maternal Grandmother     Unknown/Adopted Maternal Grandfather     Unknown/Adopted Paternal Grandmother     Unknown/Adopted Paternal Grandfather     No Known " Problems Son     No Known Problems Son      He does not have a family history of prostate cancer.  Social History     Socioeconomic History    Marital status: Single   Tobacco Use    Smoking status: Every Day     Current packs/day: 0.50     Average packs/day: 0.5 packs/day for 50.0 years (25.0 ttl pk-yrs)     Types: Cigarettes    Smokeless tobacco: Never   Vaping Use    Vaping status: Never Used   Substance and Sexual Activity    Alcohol use: Yes     Alcohol/week: 35.0 standard drinks of alcohol     Types: 42 Cans of beer per week     Comment: used  heavily - 3 beers/drink once a week since 1/19/2019    Drug use: Yes     Types: Marijuana     Comment: daily use    Sexual activity: Not Currently     Partners: Female     Social Determinants of Health     Financial Resource Strain: Low Risk  (10/6/2023)    Financial Resource Strain     Within the past 12 months, have you or your family members you live with been unable to get utilities (heat, electricity) when it was really needed?: No   Food Insecurity: High Risk (10/6/2023)    Food Insecurity     Within the past 12 months, did you worry that your food would run out before you got money to buy more?: Yes     Within the past 12 months, did the food you bought just not last and you didn t have money to get more?: Yes   Transportation Needs: High Risk (10/6/2023)    Transportation Needs     Within the past 12 months, has lack of transportation kept you from medical appointments, getting your medicines, non-medical meetings or appointments, work, or from getting things that you need?: Yes   Interpersonal Safety: Low Risk  (10/6/2023)    Interpersonal Safety     Do you feel physically and emotionally safe where you currently live?: Yes     Within the past 12 months, have you been hit, slapped, kicked or otherwise physically hurt by someone?: No     Within the past 12 months, have you been humiliated or emotionally abused in other ways by your partner or ex-partner?: No    Housing Stability: High Risk (10/6/2023)    Housing Stability     Do you have housing? : Yes     Are you worried about losing your housing?: Yes        REVIEW OF SYSTEMS  =================  C: NEGATIVE for fever, chills, change in weight  I: NEGATIVE for worrisome rashes, moles or lesions  E/M: NEGATIVE for ear, mouth and throat problems  R: NEGATIVE for significant cough or SHORTNESS OF BREATH  CV:  NEGATIVE for chest pain, palpitations or peripheral edema  GI: NEGATIVE for nausea, abdominal pain, heartburn, or change in bowel habits  NEURO: NEGATIVE numbness/weakness  : see HPI  PSYCH: NEGATIVE depression/anxiety  LYmph: no new enlarged lymph nodes  Ortho: no new trauma/movements           O: Exam:  Constitutional: healthy, alert and no distress  Cardiovascular: negative, PMI normal.   Respiratory: negative, no evidence of respiratory distress  Gastrointestinal: Abdomen soft, non-tender. BS normal. No masses, organomegaly  : penis no discharge.  Testis no masses.  No scrotal skin lesion.  Musculoskeletal: extremities normal- no gross deformities noted, gait normal and normal muscle tone  Skin: no suspicious lesions or rashes  Neurologic: Alert and oriented  Psychiatric: mentation appears normal. and affect normal/bright  Hematologic/Lymphatic/Immunologic: normal ant/post cervical, axillary, supraclavicular and inguinal nodes    Patient failed trial of void today.    Cysto done    Patient is draped and prepped.  Flexible cystoscopy placed under direct vision.      The anterior urethra is normal   The prostatic urethra showed bilateral lobe enlargement.     The length is 3cm,  the coaptation is 3 cm.     In the bladder there is trabeculation grade 2.    16 F Wilkes catheter placed.    Assessment/Plan:  (N40.1,  R33.8) Benign prostatic hyperplasia with urinary retention  (primary encounter diagnosis)  Comment:    Plan:      (N32.0) Bladder outflow obstruction  Comment:   Plan:   ASSESSMENT:  After cystoscopy, I  took the patient back to another examining room and we had a formal visit.    Regarding management, patient has been on medical treatment but still has significant lower urinary tract symptoms.  Therefore, I would recommend XPS laser transurethral resection of the prostate.  More specifically, we talked about the potential complications.  There is a 1% chance of urinary incontinence, retrograde ejaculation, bladder neck contracture, sometimes prolonged frequency and urgency due to tissue dystrophy, perioperative bleeding and sometimes delayed bleeding.  The patient has very good understanding and he wants to proceed with surgery.  I answered all of his questions and will schedule XPS laser transurethral resection of the prostate  in the near future. Discussed failure if bladder dysfunction    (R97.20) Elevated prostate specific antigen (PSA)  Comment:    Plan: recheck prn    (I10) Essential hypertension  Comment:    Plan: Edward to follow up with Primary Care provider regarding elevated blood pressure.

## 2024-10-15 ENCOUNTER — TELEPHONE (OUTPATIENT)
Dept: UROLOGY | Facility: CLINIC | Age: 64
End: 2024-10-15
Payer: COMMERCIAL

## 2024-10-15 NOTE — TELEPHONE ENCOUNTER
Type of surgery: TRANSURETHRAL RESECTION (TUR) PROSTATE, USING KTP LASER   Location of surgery: [facility] OR  Date and time of surgery: 11/4  Surgeon: Helen  Pre-Op Appt Date: 10/31  Post-Op Appt Date: hermelindo will schedule after surgery is done,. Informed 4-6 weeks postop   Packet sent out: Yes mailed   Pre-cert/Authorization completed:  Not Applicable  Date: na

## 2024-11-01 ENCOUNTER — OFFICE VISIT (OUTPATIENT)
Dept: FAMILY MEDICINE | Facility: CLINIC | Age: 64
End: 2024-11-01
Payer: COMMERCIAL

## 2024-11-01 ENCOUNTER — HOSPITAL ENCOUNTER (EMERGENCY)
Facility: CLINIC | Age: 64
Discharge: HOME | End: 2024-11-01
Attending: EMERGENCY MEDICINE | Admitting: EMERGENCY MEDICINE
Payer: COMMERCIAL

## 2024-11-01 ENCOUNTER — TELEPHONE (OUTPATIENT)
Dept: UROLOGY | Facility: CLINIC | Age: 64
End: 2024-11-01

## 2024-11-01 ENCOUNTER — TELEPHONE (OUTPATIENT)
Dept: ADMISSION | Facility: CLINIC | Age: 64
End: 2024-11-01

## 2024-11-01 VITALS
OXYGEN SATURATION: 99 % | HEIGHT: 69 IN | RESPIRATION RATE: 16 BRPM | TEMPERATURE: 98.5 F | SYSTOLIC BLOOD PRESSURE: 138 MMHG | HEART RATE: 66 BPM | WEIGHT: 161 LBS | BODY MASS INDEX: 23.85 KG/M2 | DIASTOLIC BLOOD PRESSURE: 88 MMHG

## 2024-11-01 VITALS
RESPIRATION RATE: 18 BRPM | TEMPERATURE: 97.6 F | HEART RATE: 80 BPM | SYSTOLIC BLOOD PRESSURE: 155 MMHG | DIASTOLIC BLOOD PRESSURE: 104 MMHG | OXYGEN SATURATION: 100 %

## 2024-11-01 DIAGNOSIS — Z01.818 PREOP GENERAL PHYSICAL EXAM: ICD-10-CM

## 2024-11-01 DIAGNOSIS — R33.8 BENIGN PROSTATIC HYPERPLASIA WITH URINARY RETENTION: Primary | ICD-10-CM

## 2024-11-01 DIAGNOSIS — Z96.0 INDWELLING CATHETER PRESENT ON ADMISSION: ICD-10-CM

## 2024-11-01 DIAGNOSIS — N32.0 BLADDER OUTLET OBSTRUCTION: ICD-10-CM

## 2024-11-01 DIAGNOSIS — R97.20 ELEVATED PROSTATE SPECIFIC ANTIGEN (PSA): ICD-10-CM

## 2024-11-01 DIAGNOSIS — Z23 NEED FOR TETANUS BOOSTER: ICD-10-CM

## 2024-11-01 DIAGNOSIS — N40.1 BENIGN PROSTATIC HYPERPLASIA WITH URINARY RETENTION: Primary | ICD-10-CM

## 2024-11-01 DIAGNOSIS — T83.9XXA FOLEY CATHETER PROBLEM, INITIAL ENCOUNTER (H): ICD-10-CM

## 2024-11-01 DIAGNOSIS — I10 ESSENTIAL HYPERTENSION: ICD-10-CM

## 2024-11-01 LAB
ANION GAP SERPL CALCULATED.3IONS-SCNC: 11 MMOL/L (ref 7–15)
BUN SERPL-MCNC: 11.7 MG/DL (ref 8–23)
CALCIUM SERPL-MCNC: 9.2 MG/DL (ref 8.8–10.4)
CHLORIDE SERPL-SCNC: 103 MMOL/L (ref 98–107)
CREAT SERPL-MCNC: 0.89 MG/DL (ref 0.67–1.17)
EGFRCR SERPLBLD CKD-EPI 2021: >90 ML/MIN/1.73M2
ERYTHROCYTE [DISTWIDTH] IN BLOOD BY AUTOMATED COUNT: 13 % (ref 10–15)
GLUCOSE SERPL-MCNC: 98 MG/DL (ref 70–99)
HCO3 SERPL-SCNC: 24 MMOL/L (ref 22–29)
HCT VFR BLD AUTO: 44.9 % (ref 40–53)
HGB BLD-MCNC: 14.9 G/DL (ref 13.3–17.7)
MCH RBC QN AUTO: 29.3 PG (ref 26.5–33)
MCHC RBC AUTO-ENTMCNC: 33.2 G/DL (ref 31.5–36.5)
MCV RBC AUTO: 88 FL (ref 78–100)
PLATELET # BLD AUTO: 305 10E3/UL (ref 150–450)
POTASSIUM SERPL-SCNC: 4.9 MMOL/L (ref 3.4–5.3)
RBC # BLD AUTO: 5.08 10E6/UL (ref 4.4–5.9)
SODIUM SERPL-SCNC: 138 MMOL/L (ref 135–145)
WBC # BLD AUTO: 8 10E3/UL (ref 4–11)

## 2024-11-01 PROCEDURE — 99283 EMERGENCY DEPT VISIT LOW MDM: CPT | Mod: 25 | Performed by: EMERGENCY MEDICINE

## 2024-11-01 PROCEDURE — 85027 COMPLETE CBC AUTOMATED: CPT

## 2024-11-01 PROCEDURE — 99214 OFFICE O/P EST MOD 30 MIN: CPT | Mod: 25

## 2024-11-01 PROCEDURE — 80048 BASIC METABOLIC PNL TOTAL CA: CPT

## 2024-11-01 PROCEDURE — 90471 IMMUNIZATION ADMIN: CPT

## 2024-11-01 PROCEDURE — 87086 URINE CULTURE/COLONY COUNT: CPT

## 2024-11-01 PROCEDURE — 90715 TDAP VACCINE 7 YRS/> IM: CPT

## 2024-11-01 PROCEDURE — 36415 COLL VENOUS BLD VENIPUNCTURE: CPT

## 2024-11-01 PROCEDURE — 99283 EMERGENCY DEPT VISIT LOW MDM: CPT | Performed by: EMERGENCY MEDICINE

## 2024-11-01 PROCEDURE — 51702 INSERT TEMP BLADDER CATH: CPT | Performed by: EMERGENCY MEDICINE

## 2024-11-01 RX ORDER — LIDOCAINE HYDROCHLORIDE 20 MG/ML
JELLY TOPICAL ONCE
Status: DISCONTINUED | OUTPATIENT
Start: 2024-11-01 | End: 2024-11-01 | Stop reason: HOSPADM

## 2024-11-01 RX ORDER — LIDOCAINE HYDROCHLORIDE 20 MG/ML
JELLY TOPICAL
Status: DISCONTINUED | OUTPATIENT
Start: 2024-11-01 | End: 2024-11-01 | Stop reason: HOSPADM

## 2024-11-01 ASSESSMENT — PAIN SCALES - GENERAL: PAINLEVEL_OUTOF10: MODERATE PAIN (5)

## 2024-11-01 ASSESSMENT — COLUMBIA-SUICIDE SEVERITY RATING SCALE - C-SSRS
1. IN THE PAST MONTH, HAVE YOU WISHED YOU WERE DEAD OR WISHED YOU COULD GO TO SLEEP AND NOT WAKE UP?: NO
6. HAVE YOU EVER DONE ANYTHING, STARTED TO DO ANYTHING, OR PREPARED TO DO ANYTHING TO END YOUR LIFE?: NO
2. HAVE YOU ACTUALLY HAD ANY THOUGHTS OF KILLING YOURSELF IN THE PAST MONTH?: NO

## 2024-11-01 ASSESSMENT — ACTIVITIES OF DAILY LIVING (ADL): ADLS_ACUITY_SCORE: 0

## 2024-11-01 NOTE — ED PROVIDER NOTES
"  History     Chief Complaint   Patient presents with    Catheter Problem     HPI  Iftikhar DOVE is a 64 year old male who presents with concern of catheter problem.  He had a Wilkes catheter placed due to an enlarged prostate and having urinary retention.  Said that he has surgery scheduled for Monday with Dr. Cervantes.  He was showering today and noticed that the Wilkes catheter fell out.  He said he is not sure what happened but thinks that it may have gotten pulled.  Denies any pain.  He said he brought the catheter with him.  Before the catheter came out he was doing well with it and it had been in for the last 3 weeks.  No fever.    Allergies:  Allergies   Allergen Reactions    Pcn [Penicillins] Unknown     Happened as child, states \"was in the hospital\"       Problem List:    Patient Active Problem List    Diagnosis Date Noted    Alcohol dependence (H) 10/14/2024     Priority: Medium    Tobacco use disorder 2021     Priority: Medium    Alcohol dependence in remission (H) 2021     Priority: Medium    Elevated prostate specific antigen (PSA) 2019     Priority: Medium    Vitamin D deficiency 2019     Priority: Medium    Umbilical hernia without obstruction and without gangrene 2019     Priority: Medium    Chronic bilateral low back pain without sciatica 2019     Priority: Medium    Essential hypertension 2019     Priority: Medium        Past Medical History:    Past Medical History:   Diagnosis Date    Alcohol dependence (H) 2021    Hypertension        Past Surgical History:    Past Surgical History:   Procedure Laterality Date    NO HISTORY OF SURGERY         Family History:    Family History   Problem Relation Age of Onset    Cancer Mother         colon -  75    Cancer Father         brain tumor    Cancer Sister         female cancer    Unknown/Adopted Sister     Unknown/Adopted Sister     Unknown/Adopted Sister     Unknown/Adopted Maternal Grandmother     " Unknown/Adopted Maternal Grandfather     Unknown/Adopted Paternal Grandmother     Unknown/Adopted Paternal Grandfather     No Known Problems Son     No Known Problems Son        Social History:  Marital Status:  Single [1]  Social History     Tobacco Use    Smoking status: Every Day     Current packs/day: 0.50     Average packs/day: 0.5 packs/day for 50.0 years (25.0 ttl pk-yrs)     Types: Cigarettes    Smokeless tobacco: Never   Vaping Use    Vaping status: Never Used   Substance Use Topics    Alcohol use: Yes     Alcohol/week: 35.0 standard drinks of alcohol     Types: 42 Cans of beer per week     Comment: used  heavily - 3 beers/drink once a week since 1/19/2019    Drug use: Yes     Types: Marijuana     Comment: daily use        Medications:    atenolol (TENORMIN) 50 MG tablet  meloxicam (MOBIC) 15 MG tablet  methocarbamol (ROBAXIN) 500 MG tablet  tamsulosin (FLOMAX) 0.4 MG capsule  vitamin D3 (CHOLECALCIFEROL) 50 mcg (2000 units) tablet          Review of Systems   All other systems reviewed and are negative.      Physical Exam   BP: (!) 155/104  Pulse: 80  Temp: 97.6  F (36.4  C)  Resp: 18  SpO2: 100 %      Physical Exam  Vitals and nursing note reviewed.   Constitutional:       General: He is not in acute distress.  Pulmonary:      Effort: Pulmonary effort is normal.   Abdominal:      General: Bowel sounds are normal.      Palpations: Abdomen is soft.      Tenderness: There is no abdominal tenderness. There is no guarding.   Skin:     General: Skin is warm.   Neurological:      Mental Status: He is alert.         ED Course        Procedures              Critical Care time:  none                Medications   lidocaine (XYLOCAINE) 2 % external gel (has no administration in time range)   lidocaine (XYLOCAINE) 2 % external gel (has no administration in time range)       Assessments & Plan (with Medical Decision Making)  Iftikhar is a 64-year-old male with past medical history of BPH with urinary retention, had  indwelling Wilkes catheter, presents with concern of catheter displacement.  See history of physical exam as above  Pleasant 64-year-old male in no acute distress, is hypertensive with blood pressure 155/104 but otherwise vitally stable and afebrile.  After examining the catheter, it just appears the balloon had been deflated.  It was still intact and did not have any air leaks.  He does not have any signs of trauma.  RN inserted new Wilkes catheter without any issue.  Utilized Uro-Jet for comfort.  Clear yellow urine draining into bag.  Patient was discharged in stable condition advised to follow-up as planned on Monday for his procedure.     I have reviewed the nursing notes.    I have reviewed the findings, diagnosis, plan and need for follow up with the patient.           Medical Decision Making  The patient's presentation was of straightforward complexity (a clearly self-limited or minor problem).    The patient's evaluation involved:  history and exam without other MDM data elements    The patient's management necessitated only low risk treatment.        Discharge Medication List as of 11/1/2024  5:37 PM          Final diagnoses:   Wilkes catheter problem, initial encounter (H)       11/1/2024   [facility] EMERGENCY DEPT       Shirley Machuca,   11/01/24 1747

## 2024-11-01 NOTE — TELEPHONE ENCOUNTER
Patient reports catheter malfunction.  Patient will present to the ED to have a new catheter placed within the next few hours as catheter has been out for one hour.  Patient was satisfied with plan.  Ana Wright RN on 11/1/2024 at 4:17 PM

## 2024-11-01 NOTE — LETTER
November 4, 2024      Iftikhar DOVE  [address] [id number]        Dear ,    We are writing to inform you of your test results.    Your test results fall within the expected range(s) or remain unchanged from previous results.  Please continue with current treatment plan.    Resulted Orders   Urine Culture   Result Value Ref Range    Culture No Growth    Basic metabolic panel  (Ca, Cl, CO2, Creat, Gluc, K, Na, BUN)   Result Value Ref Range    Sodium 138 135 - 145 mmol/L    Potassium 4.9 3.4 - 5.3 mmol/L    Chloride 103 98 - 107 mmol/L    Carbon Dioxide (CO2) 24 22 - 29 mmol/L    Anion Gap 11 7 - 15 mmol/L    Urea Nitrogen 11.7 8.0 - 23.0 mg/dL    Creatinine 0.89 0.67 - 1.17 mg/dL    GFR Estimate >90 >60 mL/min/1.73m2      Comment:      eGFR calculated using 2021 CKD-EPI equation.    Calcium 9.2 8.8 - 10.4 mg/dL      Comment:      Reference intervals for this test were updated on 7/16/2024 to reflect our healthy population more accurately. There may be differences in the flagging of prior results with similar values performed with this method. Those prior results can be interpreted in the context of the updated reference intervals.    Glucose 98 70 - 99 mg/dL   CBC with platelets   Result Value Ref Range    WBC Count 8.0 4.0 - 11.0 10e3/uL    RBC Count 5.08 4.40 - 5.90 10e6/uL    Hemoglobin 14.9 13.3 - 17.7 g/dL    Hematocrit 44.9 40.0 - 53.0 %    MCV 88 78 - 100 fL    MCH 29.3 26.5 - 33.0 pg    MCHC 33.2 31.5 - 36.5 g/dL    RDW 13.0 10.0 - 15.0 %    Platelet Count 305 150 - 450 10e3/uL       If you have any questions or concerns, please call the clinic at the number listed above.       Sincerely,      PEGGY Aly CNP

## 2024-11-01 NOTE — PROGRESS NOTES
Preoperative Evaluation  [facility]  [address]  [place] [address]  Phone: [phone]  Fax: [phone]  Primary Provider: Blaise Chance Mai, MD  Pre-op Performing Provider: PEGGY Aly CNP  Nov 1, 2024 11/1/2024   Surgical Information   What procedure is being done? prostate surgery    Facility or Hospital where procedure/surgery will be performed: [place] in [facility]    Who is doing the procedure / surgery? dr colunga    Date of surgery / procedure: monday november 4th    Time of surgery / procedure: 11am    Where do you plan to recover after surgery? at home with family        Patient-reported     Fax number for surgical facility: Note does not need to be faxed, will be available electronically in Epic.    Assessment & Plan     The proposed surgical procedure is considered INTERMEDIATE risk.    Preop general physical exam  Patient is a 64 year-old male with hypertension presenting for preoperative physical exam. Meets 4 METS. Preoperative labs completed. No EKG required, no history of coronary heart disease, significant arrhythmia, peripheral arterial disease or other structural heart disease. Discussed medication that require holding prior to procedure. Patient is cleared for above procedure.     Benign prostatic hyperplasia with urinary retention  Bladder outlet obstruction  Following with urology, plan for above procedure.   - Basic metabolic panel  (Ca, Cl, CO2, Creat, Gluc, K, Na, BUN); Future  - CBC with platelets; Future    Elevated prostate specific antigen (PSA)  Following with urology.     Essential hypertension  Taking atenolol as prescribed. Blood pressure elevated initially but returned to desired range with second check.     Indwelling catheter present on admission  Urine culture given upcoming TURP and current indwelling catheter. Denies fever, chills, mental status change, suprapubic pain, or other urinary symptoms.   - Urine Culture;  Future    Need for tetanus booster  Updated during encounter due to presurgical status.   - TDAP 10-64Y (ADACEL,BOOSTRIX)           Risks and Recommendations  The patient has the following additional risks and recommendations for perioperative complications:   - History of urinary retention    Preoperative Medication Instructions  Antiplatelet or Anticoagulation Medication Instructions   - Patient is on no antiplatelet or anticoagulation medications.    Additional Medication Instructions   - Beta Blockers: Continue taking on the day of surgery.   - Herbal medications and vitamins: DO NOT TAKE 14 days prior to surgery.   - ibuprofen (Advil, Motrin): DO NOT TAKE 1 day before surgery.    - meloxicam (Mobic): DO NOT TAKE 10 days before surgery.    - naproxen (Aleve, Naprosyn): DO NOT TAKE 4 days before surgery.    - anticholinergics: DO NOT TAKE anticholingeric medication in older patient at risk of delirium.    - Topicals: DO NOT TAKE day of surgery.    Recommendation  Approval given to proceed with proposed procedure pending review of diagnostic evaluation.    Win Buitrago is a 64 year old, presenting for the following:  Pre-Op Exam        11/1/2024    12:54 PM   Additional Questions   Roomed by AD   Accompanied by Nephbillie GONCALVES related to upcoming procedure: urinary retention, BPH with LUTS         11/1/2024   Pre-Op Questionnaire   Have you ever had a heart attack or stroke? No    Have you ever had surgery on your heart or blood vessels, such as a stent placement, a coronary artery bypass, or surgery on an artery in your head, neck, heart, or legs? No    Do you have chest pain with activity? No    Do you have a history of heart failure? No    Do you currently have a cold, bronchitis or symptoms of other infection? No    Do you have a cough, shortness of breath, or wheezing? No    Do you or anyone in your family have previous history of blood clots? No    Do you or does anyone in your family have a serious  bleeding problem such as prolonged bleeding following surgeries or cuts? No    Have you ever had problems with anemia or been told to take iron pills? No    Have you had any abnormal blood loss such as black, tarry or bloody stools? No    Have you ever had a blood transfusion? No    Are you willing to have a blood transfusion if it is medically needed before, during, or after your surgery? Yes    Have you or any of your relatives ever had problems with anesthesia? No    Do you have sleep apnea, excessive snoring or daytime drowsiness? No    Do you have any artifical heart valves or other implanted medical devices like a pacemaker, defibrillator, or continuous glucose monitor? No    Do you have artificial joints? No    Are you allergic to latex? No        Patient-reported     Health Care Directive  Patient does not have a Health Care Directive: Discussed advance care planning with patient; however, patient declined at this time.    Preoperative Review of    reviewed - controlled substances reflected in medication list.      Status of Chronic Conditions:  HYPERTENSION - Patient has longstanding history of HTN , currently denies any symptoms referable to elevated blood pressure. Specifically denies chest pain, palpitations, dyspnea, orthopnea, PND or peripheral edema. Blood pressure readings have been in normal range. Current medication regimen is as listed below. Patient denies any side effects of medication.     Patient Active Problem List    Diagnosis Date Noted    Alcohol dependence (H) 10/14/2024     Priority: Medium    Tobacco use disorder 01/22/2021     Priority: Medium    Alcohol dependence in remission (H) 01/22/2021     Priority: Medium    Elevated prostate specific antigen (PSA) 03/07/2019     Priority: Medium    Vitamin D deficiency 03/07/2019     Priority: Medium    Umbilical hernia without obstruction and without gangrene 03/07/2019     Priority: Medium    Chronic bilateral low back pain without  "sciatica 03/04/2019     Priority: Medium    Essential hypertension 03/04/2019     Priority: Medium      Past Medical History:   Diagnosis Date    Alcohol dependence (H) 02/24/2021    Hypertension      Past Surgical History:   Procedure Laterality Date    NO HISTORY OF SURGERY       Current Outpatient Medications   Medication Sig Dispense Refill    atenolol (TENORMIN) 50 MG tablet Take 1 tablet (50 mg) by mouth daily Please stop the chlorthalidone 90 tablet 1    meloxicam (MOBIC) 15 MG tablet Take 1 tablet (15 mg) by mouth daily 90 tablet 1    methocarbamol (ROBAXIN) 500 MG tablet Take 1 tablet (500 mg) by mouth 2 times daily as needed for muscle spasms 60 tablet 2    tamsulosin (FLOMAX) 0.4 MG capsule Take 1 capsule (0.4 mg) by mouth daily 90 capsule 1    vitamin D3 (CHOLECALCIFEROL) 50 mcg (2000 units) tablet Take 1 tablet (50 mcg) by mouth daily 100 tablet 3     Allergies   Allergen Reactions    Pcn [Penicillins] Unknown     Happened as child, states \"was in the hospital\"      Social History     Tobacco Use    Smoking status: Every Day     Current packs/day: 0.50     Average packs/day: 0.5 packs/day for 50.0 years (25.0 ttl pk-yrs)     Types: Cigarettes    Smokeless tobacco: Never   Substance Use Topics    Alcohol use: Yes     Alcohol/week: 35.0 standard drinks of alcohol     Types: 42 Cans of beer per week     Comment: used  heavily - 3 beers/drink once a week since 1/19/2019     History   Drug Use    Types: Marijuana     Comment: daily use         Review of Systems  Constitutional, HEENT, cardiovascular, pulmonary, gi and gu systems are negative, except as otherwise noted.    Objective    BP (!) 150/96 (BP Location: Left arm, Patient Position: Sitting, Cuff Size: Adult Regular)   Pulse 66   Temp 98.5  F (36.9  C) (Temporal)   Resp 16   Ht 1.753 m (5' 9.02\")   Wt 73 kg (161 lb)   SpO2 99%   BMI 23.76 kg/m     Estimated body mass index is 23.76 kg/m  as calculated from the following:    Height as of this " "encounter: 1.753 m (5' 9.02\").    Weight as of this encounter: 73 kg (161 lb).  Physical Exam  GENERAL: alert and no distress  EYES: Eyes grossly normal to inspection, PERRL and conjunctivae and sclerae normal  HENT: ear canals and TM's normal, nose and mouth without ulcers or lesions  NECK: no adenopathy, no asymmetry, masses, or scars  RESP: lungs clear to auscultation - no rales, rhonchi or wheezes  CV: regular rate and rhythm, normal S1 S2, no S3 or S4, no murmur, click or rub, no peripheral edema  ABDOMEN: soft, nontender, no hepatosplenomegaly, no masses and bowel sounds normal  MS: no gross musculoskeletal defects noted, no edema  SKIN: no suspicious lesions or rashes  NEURO: Normal strength and tone, mentation intact and speech normal  PSYCH: mentation appears normal, affect normal/bright  LYMPH: no cervical or supraclavicular adenopathy        Diagnostics  Labs pending at this time.  Results will be reviewed when available.   No EKG required, no history of coronary heart disease, significant arrhythmia, peripheral arterial disease or other structural heart disease.    Revised Cardiac Risk Index (RCRI)  The patient has the following serious cardiovascular risks for perioperative complications:   - No serious cardiac risks = 0 points     RCRI Interpretation: 0 points: Class I (very low risk - 0.4% complication rate)         Signed Electronically by: PEGGY Aly CNP  A copy of this evaluation report is provided to the requesting physician.         "

## 2024-11-01 NOTE — ED TRIAGE NOTES
Pt reports that his Wilkes catheter fell out about an hour and a half ago. He states it was placed three weeks ago. States he had a pre op appointment today for prostate surgery on Monday.      Triage Assessment (Adult)       Row Name 11/01/24 9379          Triage Assessment    Airway WDL WDL        Respiratory WDL    Respiratory WDL WDL        Skin Circulation/Temperature WDL    Skin Circulation/Temperature WDL WDL        Cardiac WDL    Cardiac WDL WDL        Peripheral/Neurovascular WDL    Peripheral Neurovascular WDL WDL        Cognitive/Neuro/Behavioral WDL    Cognitive/Neuro/Behavioral WDL WDL

## 2024-11-01 NOTE — PROGRESS NOTES
Prior to immunization administration, verified patients identity using patient s name and date of birth. Please see Immunization Activity for additional information.     Screening Questionnaire for Adult Immunization    Are you sick today?   No   Do you have allergies to medications, food, a vaccine component or latex?   No   Have you ever had a serious reaction after receiving a vaccination?   No   Do you have a long-term health problem with heart, lung, kidney, or metabolic disease (e.g., diabetes), asthma, a blood disorder, no spleen, complement component deficiency, a cochlear implant, or a spinal fluid leak?  Are you on long-term aspirin therapy?   No   Do you have cancer, leukemia, HIV/AIDS, or any other immune system problem?   No   Do you have a parent, brother, or sister with an immune system problem?   No   In the past 3 months, have you taken medications that affect  your immune system, such as prednisone, other steroids, or anticancer drugs; drugs for the treatment of rheumatoid arthritis, Crohn s disease, or psoriasis; or have you had radiation treatments?   No   Have you had a seizure, or a brain or other nervous system problem?   No   During the past year, have you received a transfusion of blood or blood    products, or been given immune (gamma) globulin or antiviral drug?   No   For women: Are you pregnant or is there a chance you could become       pregnant during the next month?   No   Have you received any vaccinations in the past 4 weeks?   No     Immunization questionnaire answers were all negative.      Patient instructed to remain in clinic for 15 minutes afterwards, and to report any adverse reactions.     Screening performed by Erika Westfall MA on 11/1/2024 at 1:35 PM.

## 2024-11-01 NOTE — DISCHARGE INSTRUCTIONS
It looks like the balloon on your old catheter had deflated.  This is likely why it fell out.  A new catheter was placed today and balloon inflated    If you have any problems with your catheter not draining, significant pain, or any new or concerning symptoms, do not hesitate to return to the emergency room for evaluation    Keep your follow-up appointment with Dr. Cervantes on Monday

## 2024-11-01 NOTE — TELEPHONE ENCOUNTER
Reason for Call:  Other call back    Detailed comments: pt calling to state he has procedure with Cervantes 11/4 and his catheter fell out. Please return call and advise     Phone Number Patient can be reached at: Other phone number:  [phone]    Best Time: any    Can we leave a detailed message on this number? YES    Call taken on 11/1/2024 at 4:08 PM by Shirley Hoskins

## 2024-11-01 NOTE — PATIENT INSTRUCTIONS
How to Take Your Medication Before Surgery  Preoperative Medication Instructions   Antiplatelet or Anticoagulation Medication Instructions   - Patient is on no antiplatelet or anticoagulation medications.    Additional Medication Instructions   - Beta Blockers: Continue taking on the day of surgery.   - Herbal medications and vitamins: DO NOT TAKE 14 days prior to surgery.   - ibuprofen (Advil, Motrin): DO NOT TAKE 1 day before surgery.    - meloxicam (Mobic): DO NOT TAKE 10 days before surgery.    - naproxen (Aleve, Naprosyn): DO NOT TAKE 4 days before surgery.    - anticholinergics: DO NOT TAKE anticholingeric medication in older patient at risk of delirium.    - Topicals: DO NOT TAKE day of surgery.       Patient Education   Preparing for Your Surgery  For Adults  Getting started  In most cases, a nurse will call to review your health history and instructions. They will give you an arrival time based on your scheduled surgery time. Please be ready to share:  Your doctor's clinic name and phone number  Your medical, surgical, and anesthesia history  A list of allergies and sensitivities  A list of medicines, including herbal treatments and over-the-counter drugs  Whether the patient has a legal guardian (ask how to send us the papers in advance)  Note: You may not receive a call if you were seen at our PAC (Preoperative Assessment Center).  Please tell us if you're pregnant--or if there's any chance you might be pregnant. Some surgeries may injure a fetus (unborn baby), so they require a pregnancy test. Surgeries that are safe for a fetus don't always need a test, and you can choose whether to have one.   Preparing for surgery  Within 10 to 30 days of surgery: Have a pre-op exam (sometimes called an H&P, or History and Physical). This can be done at a clinic or pre-operative center.  If you're having a , you may not need this exam. Talk to your care team.  At your pre-op exam, talk to your care team about  all medicines you take. (This includes CBD oil and any drugs, such as THC, marijuana, and other forms of cannabis.) If you need to stop any medicine before surgery, ask when to start taking it again.  This is for your safety. Many medicines and drugs can make you bleed too much during surgery. Some change how well surgery (anesthesia) drugs work.  Call your insurance company to let them know you're having surgery. (If you don't have insurance, call [phone].)  Call your clinic if there's any change in your health. This includes a scrape or scratch near the surgery site, or any signs of a cold (sore throat, runny nose, cough, rash, fever).  Eating and drinking guidelines  For your safety: Unless your surgeon tells you otherwise, follow the guidelines below.  Eat and drink as normal until 8 hours before you arrive for surgery. After that, no food or milk. You can spit out gum when you arrive.  Drink clear liquids until 2 hours before you arrive. These are liquids you can see through, like water, Gatorade, and Propel Water. They also include plain black coffee and tea (no cream or milk).  No alcohol for 24 hours before you arrive. The night before surgery, stop any drinks that contain THC.  If your care team tells you to take medicine on the morning of surgery, it's okay to take it with a sip of water. No other medicines or drugs are allowed (including CBD oil)--follow your care team's instructions.  If you have questions the day of surgery, call your hospital or surgery center.   Preventing infection  Shower or bathe the night before and the morning of surgery. Follow the instructions your clinic gave you. (If no instructions, use regular soap.)  Don't shave or clip hair near your surgery site. We'll remove the hair if needed.  Don't smoke or vape the morning of surgery. No chewing tobacco for 6 hours before you arrive. A nicotine patch is okay. You may spit out nicotine gum when you arrive.  For some surgeries,  the surgeon will tell you to fully quit smoking and nicotine.  We will make every effort to keep you safe from infection. We will:  Clean our hands often with soap and water (or an alcohol-based hand rub).  Clean the skin at your surgery site with a special soap that kills germs.  Give you a special gown to keep you warm. (Cold raises the risk of infection.)  Wear hair covers, masks, gowns, and gloves during surgery.  Give antibiotic medicine, if prescribed. Not all surgeries need this medicine.  What to bring on the day of surgery  Photo ID and insurance card  Copy of your health care directive, if you have one  Glasses and hearing aids (bring cases)  You can't wear contacts during surgery  Inhaler and eye drops, if you use them (tell us about these when you arrive)  CPAP machine or breathing device, if you use them  A few personal items, if spending the night  If you have . . .  A pacemaker, ICD (cardiac defibrillator), or other implant: Bring the ID card.  An implanted stimulator: Bring the remote control.  A legal guardian: Bring a copy of the certified (court-stamped) guardianship papers.  Please remove any jewelry, including body piercings. Leave jewelry and other valuables at home.  If you're going home the day of surgery  You must have a responsible adult drive you home. They should stay with you overnight as well.  If you don't have someone to stay with you, and you aren't safe to go home alone, we may keep you overnight. Insurance often won't pay for this.  After surgery  If it's hard to control your pain or you need more pain medicine, please call your surgeon's office.  Questions?   If you have any questions for your care team, list them here:   ____________________________________________________________________________________________________________________________________________________________________________________________________________________________________________________________  For  informational purposes only. Not to replace the advice of your health care provider. Copyright   2003, 2019 [facility]. All rights reserved. Clinically reviewed by Uriel Carver MD. [place] [id number] - REV 08/24.

## 2024-11-02 LAB — BACTERIA UR CULT: NO GROWTH

## 2024-11-04 ENCOUNTER — HOSPITAL ENCOUNTER (OUTPATIENT)
Facility: CLINIC | Age: 64
Discharge: HOME | End: 2024-11-04
Attending: UROLOGY | Admitting: UROLOGY
Payer: COMMERCIAL

## 2024-11-04 ENCOUNTER — ANESTHESIA EVENT (OUTPATIENT)
Dept: SURGERY | Facility: CLINIC | Age: 64
End: 2024-11-04
Payer: COMMERCIAL

## 2024-11-04 ENCOUNTER — ANESTHESIA (OUTPATIENT)
Dept: SURGERY | Facility: CLINIC | Age: 64
End: 2024-11-04
Payer: COMMERCIAL

## 2024-11-04 VITALS
HEART RATE: 68 BPM | SYSTOLIC BLOOD PRESSURE: 170 MMHG | OXYGEN SATURATION: 97 % | DIASTOLIC BLOOD PRESSURE: 92 MMHG | RESPIRATION RATE: 14 BRPM | TEMPERATURE: 97.2 F

## 2024-11-04 DIAGNOSIS — N40.1 BENIGN PROSTATIC HYPERPLASIA WITH URINARY RETENTION: Primary | ICD-10-CM

## 2024-11-04 DIAGNOSIS — R33.8 BENIGN PROSTATIC HYPERPLASIA WITH URINARY RETENTION: Primary | ICD-10-CM

## 2024-11-04 PROCEDURE — 272N000002 HC OR SUPPLY OTHER OPNP: Performed by: UROLOGY

## 2024-11-04 PROCEDURE — 272N000001 HC OR GENERAL SUPPLY STERILE: Performed by: UROLOGY

## 2024-11-04 PROCEDURE — 258N000003 HC RX IP 258 OP 636: Performed by: NURSE ANESTHETIST, CERTIFIED REGISTERED

## 2024-11-04 PROCEDURE — 370N000017 HC ANESTHESIA TECHNICAL FEE, PER MIN: Performed by: UROLOGY

## 2024-11-04 PROCEDURE — 999N000141 HC STATISTIC PRE-PROCEDURE NURSING ASSESSMENT: Performed by: UROLOGY

## 2024-11-04 PROCEDURE — 250N000011 HC RX IP 250 OP 636: Performed by: NURSE ANESTHETIST, CERTIFIED REGISTERED

## 2024-11-04 PROCEDURE — 710N000012 HC RECOVERY PHASE 2, PER MINUTE: Performed by: UROLOGY

## 2024-11-04 PROCEDURE — 250N000009 HC RX 250: Performed by: NURSE ANESTHETIST, CERTIFIED REGISTERED

## 2024-11-04 PROCEDURE — 250N000011 HC RX IP 250 OP 636: Performed by: UROLOGY

## 2024-11-04 PROCEDURE — 360N000077 HC SURGERY LEVEL 4, PER MIN: Performed by: UROLOGY

## 2024-11-04 PROCEDURE — 710N000010 HC RECOVERY PHASE 1, LEVEL 2, PER MIN: Performed by: UROLOGY

## 2024-11-04 PROCEDURE — 52648 LASER SURGERY OF PROSTATE: CPT | Performed by: UROLOGY

## 2024-11-04 PROCEDURE — 250N000025 HC SEVOFLURANE, PER MIN: Performed by: UROLOGY

## 2024-11-04 RX ORDER — MEPERIDINE HYDROCHLORIDE 25 MG/ML
12.5 INJECTION INTRAMUSCULAR; INTRAVENOUS; SUBCUTANEOUS EVERY 5 MIN PRN
Status: DISCONTINUED | OUTPATIENT
Start: 2024-11-04 | End: 2024-11-04 | Stop reason: HOSPADM

## 2024-11-04 RX ORDER — FENTANYL CITRATE 50 UG/ML
50 INJECTION, SOLUTION INTRAMUSCULAR; INTRAVENOUS EVERY 5 MIN PRN
Status: DISCONTINUED | OUTPATIENT
Start: 2024-11-04 | End: 2024-11-04 | Stop reason: HOSPADM

## 2024-11-04 RX ORDER — SODIUM CHLORIDE, SODIUM LACTATE, POTASSIUM CHLORIDE, CALCIUM CHLORIDE 600; 310; 30; 20 MG/100ML; MG/100ML; MG/100ML; MG/100ML
INJECTION, SOLUTION INTRAVENOUS CONTINUOUS
Status: DISCONTINUED | OUTPATIENT
Start: 2024-11-04 | End: 2024-11-04 | Stop reason: HOSPADM

## 2024-11-04 RX ORDER — ALBUTEROL SULFATE 0.83 MG/ML
2.5 SOLUTION RESPIRATORY (INHALATION) EVERY 4 HOURS PRN
Status: DISCONTINUED | OUTPATIENT
Start: 2024-11-04 | End: 2024-11-04 | Stop reason: HOSPADM

## 2024-11-04 RX ORDER — IPRATROPIUM BROMIDE AND ALBUTEROL SULFATE 2.5; .5 MG/3ML; MG/3ML
3 SOLUTION RESPIRATORY (INHALATION) EVERY 4 HOURS PRN
Status: DISCONTINUED | OUTPATIENT
Start: 2024-11-04 | End: 2024-11-04 | Stop reason: HOSPADM

## 2024-11-04 RX ORDER — DEXAMETHASONE SODIUM PHOSPHATE 4 MG/ML
INJECTION, SOLUTION INTRA-ARTICULAR; INTRALESIONAL; INTRAMUSCULAR; INTRAVENOUS; SOFT TISSUE PRN
Status: DISCONTINUED | OUTPATIENT
Start: 2024-11-04 | End: 2024-11-04

## 2024-11-04 RX ORDER — PROPOFOL 10 MG/ML
INJECTION, EMULSION INTRAVENOUS PRN
Status: DISCONTINUED | OUTPATIENT
Start: 2024-11-04 | End: 2024-11-04

## 2024-11-04 RX ORDER — ONDANSETRON 4 MG/1
4 TABLET, ORALLY DISINTEGRATING ORAL EVERY 30 MIN PRN
Status: DISCONTINUED | OUTPATIENT
Start: 2024-11-04 | End: 2024-11-04 | Stop reason: HOSPADM

## 2024-11-04 RX ORDER — HYDRALAZINE HYDROCHLORIDE 20 MG/ML
2.5-5 INJECTION INTRAMUSCULAR; INTRAVENOUS EVERY 10 MIN PRN
Status: DISCONTINUED | OUTPATIENT
Start: 2024-11-04 | End: 2024-11-04 | Stop reason: HOSPADM

## 2024-11-04 RX ORDER — CEFAZOLIN SODIUM/WATER 2 G/20 ML
2 SYRINGE (ML) INTRAVENOUS SEE ADMIN INSTRUCTIONS
Status: DISCONTINUED | OUTPATIENT
Start: 2024-11-04 | End: 2024-11-04 | Stop reason: HOSPADM

## 2024-11-04 RX ORDER — LIDOCAINE 40 MG/G
CREAM TOPICAL
Status: DISCONTINUED | OUTPATIENT
Start: 2024-11-04 | End: 2024-11-04 | Stop reason: HOSPADM

## 2024-11-04 RX ORDER — HYDROMORPHONE HCL IN WATER/PF 6 MG/30 ML
0.2 PATIENT CONTROLLED ANALGESIA SYRINGE INTRAVENOUS EVERY 5 MIN PRN
Status: DISCONTINUED | OUTPATIENT
Start: 2024-11-04 | End: 2024-11-04 | Stop reason: HOSPADM

## 2024-11-04 RX ORDER — ONDANSETRON 2 MG/ML
INJECTION INTRAMUSCULAR; INTRAVENOUS PRN
Status: DISCONTINUED | OUTPATIENT
Start: 2024-11-04 | End: 2024-11-04

## 2024-11-04 RX ORDER — DEXAMETHASONE SODIUM PHOSPHATE 10 MG/ML
4 INJECTION, SOLUTION INTRAMUSCULAR; INTRAVENOUS
Status: DISCONTINUED | OUTPATIENT
Start: 2024-11-04 | End: 2024-11-04 | Stop reason: HOSPADM

## 2024-11-04 RX ORDER — LABETALOL HYDROCHLORIDE 5 MG/ML
10 INJECTION, SOLUTION INTRAVENOUS
Status: DISCONTINUED | OUTPATIENT
Start: 2024-11-04 | End: 2024-11-04 | Stop reason: HOSPADM

## 2024-11-04 RX ORDER — HYDROMORPHONE HYDROCHLORIDE 1 MG/ML
0.5 INJECTION, SOLUTION INTRAMUSCULAR; INTRAVENOUS; SUBCUTANEOUS EVERY 5 MIN PRN
Status: DISCONTINUED | OUTPATIENT
Start: 2024-11-04 | End: 2024-11-04 | Stop reason: HOSPADM

## 2024-11-04 RX ORDER — EPHEDRINE SULFATE 50 MG/ML
INJECTION, SOLUTION INTRAMUSCULAR; INTRAVENOUS; SUBCUTANEOUS PRN
Status: DISCONTINUED | OUTPATIENT
Start: 2024-11-04 | End: 2024-11-04

## 2024-11-04 RX ORDER — CEFAZOLIN SODIUM/WATER 2 G/20 ML
2 SYRINGE (ML) INTRAVENOUS
Status: COMPLETED | OUTPATIENT
Start: 2024-11-04 | End: 2024-11-04

## 2024-11-04 RX ORDER — FENTANYL CITRATE 50 UG/ML
25 INJECTION, SOLUTION INTRAMUSCULAR; INTRAVENOUS EVERY 5 MIN PRN
Status: DISCONTINUED | OUTPATIENT
Start: 2024-11-04 | End: 2024-11-04 | Stop reason: HOSPADM

## 2024-11-04 RX ORDER — LIDOCAINE HYDROCHLORIDE 20 MG/ML
INJECTION, SOLUTION INFILTRATION; PERINEURAL PRN
Status: DISCONTINUED | OUTPATIENT
Start: 2024-11-04 | End: 2024-11-04

## 2024-11-04 RX ORDER — CEPHALEXIN 500 MG/1
500 CAPSULE ORAL 3 TIMES DAILY
Qty: 9 CAPSULE | Refills: 0 | Status: SHIPPED | OUTPATIENT
Start: 2024-11-04 | End: 2024-11-07

## 2024-11-04 RX ORDER — HYDROCODONE BITARTRATE AND ACETAMINOPHEN 5; 325 MG/1; MG/1
1-2 TABLET ORAL EVERY 4 HOURS PRN
Qty: 10 TABLET | Refills: 0 | Status: SHIPPED | OUTPATIENT
Start: 2024-11-04

## 2024-11-04 RX ORDER — NALOXONE HYDROCHLORIDE 0.4 MG/ML
0.1 INJECTION, SOLUTION INTRAMUSCULAR; INTRAVENOUS; SUBCUTANEOUS
Status: DISCONTINUED | OUTPATIENT
Start: 2024-11-04 | End: 2024-11-04 | Stop reason: HOSPADM

## 2024-11-04 RX ORDER — FENTANYL CITRATE 50 UG/ML
INJECTION, SOLUTION INTRAMUSCULAR; INTRAVENOUS PRN
Status: DISCONTINUED | OUTPATIENT
Start: 2024-11-04 | End: 2024-11-04

## 2024-11-04 RX ORDER — GLYCOPYRROLATE 0.2 MG/ML
INJECTION, SOLUTION INTRAMUSCULAR; INTRAVENOUS PRN
Status: DISCONTINUED | OUTPATIENT
Start: 2024-11-04 | End: 2024-11-04

## 2024-11-04 RX ORDER — ONDANSETRON 2 MG/ML
4 INJECTION INTRAMUSCULAR; INTRAVENOUS EVERY 30 MIN PRN
Status: DISCONTINUED | OUTPATIENT
Start: 2024-11-04 | End: 2024-11-04 | Stop reason: HOSPADM

## 2024-11-04 RX ADMIN — PHENYLEPHRINE HYDROCHLORIDE 100 MCG: 10 INJECTION INTRAVENOUS at 12:44

## 2024-11-04 RX ADMIN — PHENYLEPHRINE HYDROCHLORIDE 100 MCG: 10 INJECTION INTRAVENOUS at 12:49

## 2024-11-04 RX ADMIN — MIDAZOLAM 2 MG: 1 INJECTION INTRAMUSCULAR; INTRAVENOUS at 12:21

## 2024-11-04 RX ADMIN — DEXAMETHASONE SODIUM PHOSPHATE 5 MG: 4 INJECTION, SOLUTION INTRA-ARTICULAR; INTRALESIONAL; INTRAMUSCULAR; INTRAVENOUS; SOFT TISSUE at 12:34

## 2024-11-04 RX ADMIN — SODIUM CHLORIDE, POTASSIUM CHLORIDE, SODIUM LACTATE AND CALCIUM CHLORIDE: 600; 310; 30; 20 INJECTION, SOLUTION INTRAVENOUS at 11:58

## 2024-11-04 RX ADMIN — PHENYLEPHRINE HYDROCHLORIDE 100 MCG: 10 INJECTION INTRAVENOUS at 12:37

## 2024-11-04 RX ADMIN — FENTANYL CITRATE 100 MCG: 50 INJECTION INTRAMUSCULAR; INTRAVENOUS at 12:25

## 2024-11-04 RX ADMIN — PROPOFOL 200 MG: 10 INJECTION, EMULSION INTRAVENOUS at 12:27

## 2024-11-04 RX ADMIN — Medication 10 MG: at 12:50

## 2024-11-04 RX ADMIN — Medication 15 MG: at 12:47

## 2024-11-04 RX ADMIN — PHENYLEPHRINE HYDROCHLORIDE 100 MCG: 10 INJECTION INTRAVENOUS at 12:47

## 2024-11-04 RX ADMIN — FENTANYL CITRATE 100 MCG: 50 INJECTION INTRAMUSCULAR; INTRAVENOUS at 12:36

## 2024-11-04 RX ADMIN — ONDANSETRON 4 MG: 2 INJECTION INTRAMUSCULAR; INTRAVENOUS at 12:34

## 2024-11-04 RX ADMIN — LIDOCAINE HYDROCHLORIDE 50 MG: 20 INJECTION, SOLUTION INFILTRATION; PERINEURAL at 12:27

## 2024-11-04 RX ADMIN — GLYCOPYRROLATE 0.2 MG: 0.2 INJECTION, SOLUTION INTRAMUSCULAR; INTRAVENOUS at 12:49

## 2024-11-04 RX ADMIN — PROPOFOL 100 MG: 10 INJECTION, EMULSION INTRAVENOUS at 12:31

## 2024-11-04 RX ADMIN — Medication 2 G: at 12:21

## 2024-11-04 ASSESSMENT — ACTIVITIES OF DAILY LIVING (ADL)
ADLS_ACUITY_SCORE: 0

## 2024-11-04 ASSESSMENT — LIFESTYLE VARIABLES: TOBACCO_USE: 1

## 2024-11-04 NOTE — OP NOTE
PREOPERATIVE DIAGNOSIS:  Benign prostatic hyperplasia with obstruction      POSTOPERATIVE DIAGNOSIS:  Benign prostatic hyperplasia with obstruction      PROCEDURE DONE:  XPS laser vaporization of prostate.       DESCRIPTION OF PROCEDURE:  He was brought to the operating room and after general anesthesia was induced, he was placed in the dorsal lithotomy position.  He was draped and prepped in the usual sterile surgical fashion.  Preop enema was given.  A cystoscope was then placed into the bladder.  The patient has lateral prostatic obstruction with the lateral lobes measured about 3 cm in length.  Using the XPS laser fiber, I proceeded to vaporize the prostate from the bladder neck toward the verumontanum.  A total of [id number] joules of energy was used.  At the end of the procedure, the prostatic fossa was wide open.  A 22-Swedish 3-way catheter was then placed.  The patient tolerated the procedure well.  There were no complications identified during the procedure.  There was minimal bleeding during the procedure.           KAROLYN GORDILLO MD

## 2024-11-04 NOTE — ANESTHESIA PROCEDURE NOTES
Airway       Patient location during procedure: OR  Staff -        Performed By: CRNA  Consent for Airway        Urgency: elective  Indications and Patient Condition       Indications for airway management: javier-procedural       Induction type:intravenous       Mask difficulty assessment: 1 - vent by mask    Final Airway Details       Final airway type: supraglottic airway    Supraglottic Airway Details        Type: LMA       Brand: LMA Unique       LMA size: 5    Post intubation assessment        Placement verified by: capnometry, equal breath sounds and chest rise        Number of attempts at approach: 1       Number of other approaches attempted: 0       Secured with: plastic tape       Ease of procedure: easy       Dentition: Intact and Unchanged

## 2024-11-04 NOTE — ANESTHESIA POSTPROCEDURE EVALUATION
Patient: Iftikhar DOVE    Procedure: Procedure(s):  TRANSURETHRAL RESECTION (TUR) PROSTATE, USING KTP LASER       Anesthesia Type:  General    Note:  Disposition: Outpatient   Postop Pain Control: Uneventful            Sign Out: Well controlled pain   PONV: No   Neuro/Psych: Uneventful            Sign Out: Acceptable/Baseline neuro status   Airway/Respiratory: Uneventful            Sign Out: Acceptable/Baseline resp. status   CV/Hemodynamics: Uneventful            Sign Out: Acceptable CV status   Other NRE: NONE   DID A NON-ROUTINE EVENT OCCUR? No    Event details/Postop Comments:  Pt was happy with anesthesia care.  No complications.  I will follow up with the pt if needed.           Last vitals:  Vitals Value Taken Time   /90 11/04/24 1345   Temp 97.16  F (36.2  C) 11/04/24 1346   Pulse 70 11/04/24 1346   Resp 16 11/04/24 1346   SpO2 96 % 11/04/24 1346   Vitals shown include unfiled device data.    Electronically Signed By: PEGGY Ibarra CRNA  November 4, 2024  3:44 PM

## 2024-11-04 NOTE — ANESTHESIA PREPROCEDURE EVALUATION
"Anesthesia Pre-Procedure Evaluation    Patient: Iftikhar DOVE   MRN: [id number] : 1960        Procedure : Procedure(s):  TRANSURETHRAL RESECTION (TUR) PROSTATE, USING KTP LASER          Past Medical History:   Diagnosis Date    Alcohol dependence (H) 2021    Hypertension       Past Surgical History:   Procedure Laterality Date    NO HISTORY OF SURGERY        Allergies   Allergen Reactions    Pcn [Penicillins] Unknown     Happened as child, states \"was in the hospital\"      Social History     Tobacco Use    Smoking status: Every Day     Current packs/day: 0.50     Average packs/day: 0.5 packs/day for 50.0 years (25.0 ttl pk-yrs)     Types: Cigarettes    Smokeless tobacco: Never   Substance Use Topics    Alcohol use: Yes     Alcohol/week: 35.0 standard drinks of alcohol     Types: 42 Cans of beer per week     Comment: used  heavily - 3 beers/drink once a week since 2019      Wt Readings from Last 1 Encounters:   24 73 kg (161 lb)        Anesthesia Evaluation            ROS/MED HX  ENT/Pulmonary:     (+)                tobacco use, Current use,                       Neurologic:  - neg neurologic ROS     Cardiovascular:     (+) Dyslipidemia hypertension- -   -  - -                                      METS/Exercise Tolerance:     Hematologic:  - neg hematologic  ROS     Musculoskeletal:  - neg musculoskeletal ROS     GI/Hepatic:  - neg GI/hepatic ROS     Renal/Genitourinary:  - neg Renal ROS     Endo:  - neg endo ROS     Psychiatric/Substance Use:     (+)   alcohol abuse      Infectious Disease:  - neg infectious disease ROS     Malignancy:  - neg malignancy ROS     Other:            Physical Exam    Airway  airway exam normal      Mallampati: II   TM distance: > 3 FB   Neck ROM: full   Mouth opening: > 3 cm    Respiratory Devices and Support         Dental           Cardiovascular   cardiovascular exam normal       Rhythm and rate: regular and normal     Pulmonary   pulmonary exam normal  " "      breath sounds clear to auscultation           OUTSIDE LABS:  CBC:   Lab Results   Component Value Date    WBC 8.0 11/01/2024    WBC 8.6 12/26/2023    HGB 14.9 11/01/2024    HGB 14.9 12/26/2023    HCT 44.9 11/01/2024    HCT 44.1 12/26/2023     11/01/2024     12/26/2023     BMP:   Lab Results   Component Value Date     11/01/2024     (L) 12/26/2023    POTASSIUM 4.9 11/01/2024    POTASSIUM 3.5 12/26/2023    CHLORIDE 103 11/01/2024    CHLORIDE 95 (L) 12/26/2023    CO2 24 11/01/2024    CO2 24 12/26/2023    BUN 11.7 11/01/2024    BUN 8.9 12/26/2023    CR 0.89 11/01/2024    CR 0.77 12/26/2023    GLC 98 11/01/2024    GLC 99 12/26/2023     COAGS: No results found for: \"PTT\", \"INR\", \"FIBR\"  POC: No results found for: \"BGM\", \"HCG\", \"HCGS\"  HEPATIC:   Lab Results   Component Value Date    ALBUMIN 4.3 12/26/2023    PROTTOTAL 6.5 12/26/2023    ALT 38 12/26/2023    AST 25 12/26/2023    ALKPHOS 67 12/26/2023    BILITOTAL 0.5 12/26/2023     OTHER:   Lab Results   Component Value Date    ROE 9.2 11/01/2024    TSH 1.40 01/20/2021       Anesthesia Plan    ASA Status:  2    NPO Status:  NPO Appropriate    Anesthesia Type: General.     - Airway: LMA   Induction: Intravenous, Propofol.   Maintenance: Balanced.        Consents    Anesthesia Plan(s) and associated risks, benefits, and realistic alternatives discussed. Questions answered and patient/representative(s) expressed understanding.     - Discussed:     - Discussed with:  Patient      - Extended Intubation/Ventilatory Support Discussed: No.      - Patient is DNR/DNI Status: No     Use of blood products discussed: No .     Postoperative Care    Pain management: IV analgesics, Oral pain medications.   PONV prophylaxis: Ondansetron (or other 5HT-3), Dexamethasone or Solumedrol, Background Propofol Infusion     Comments:    Other Comments: The risks and benefits of anesthesia, and the alternatives where applicable, have been discussed with the patient, " and they wish to proceed.              PEGGY Ibarra CRNA    I have reviewed the pertinent notes and labs in the chart from the past 30 days and (re)examined the patient.  Any updates or changes from those notes are reflected in this note.               # Hypertension: Noted on problem list

## 2024-11-04 NOTE — BRIEF OP NOTE
[facility]    Brief Operative Note    Pre-operative diagnosis: Benign prostatic hyperplasia with urinary retention [N40.1, R33.8]  Post-operative diagnosis Same as pre-operative diagnosis    Procedure: TRANSURETHRAL RESECTION (TUR) PROSTATE, USING KTP LASER, N/A - Penis    Surgeon: Surgeons and Role:     * Denny Cervantes MD - Primary  Anesthesia: General   Estimated Blood Loss: 1 mL from 11/4/2024 12:22 PM to 11/4/2024  1:26 PM      Drains: None  Specimens: * No specimens in log *  Findings:   None.  Complications: None.  Implants: * No implants in log *

## 2024-11-04 NOTE — ANESTHESIA CARE TRANSFER NOTE
Patient: Iftikhar DOVE    Procedure: Procedure(s):  TRANSURETHRAL RESECTION (TUR) PROSTATE, USING KTP LASER       Diagnosis: Benign prostatic hyperplasia with urinary retention [N40.1, R33.8]  Diagnosis Additional Information: No value filed.    Anesthesia Type:   General     Note:    Oropharynx: oropharynx clear of all foreign objects and spontaneously breathing  Level of Consciousness: drowsy  Oxygen Supplementation: face mask    Independent Airway: airway patency satisfactory and stable  Dentition: dentition unchanged  Vital Signs Stable: post-procedure vital signs reviewed and stable  Report to RN Given: handoff report given  Patient transferred to: PACU    Handoff Report: Identifed the Patient, Identified the Reponsible Provider, Reviewed the pertinent medical history, Discussed the surgical course, Reviewed Intra-OP anesthesia mangement and issues during anesthesia, Set expectations for post-procedure period and Allowed opportunity for questions and acknowledgement of understanding      Vitals:  Vitals Value Taken Time   BP     Temp 97.52  F (36.4  C) 11/04/24 1327   Pulse 70 11/04/24 1327   Resp 15 11/04/24 1327   SpO2 99 % 11/04/24 1327   Vitals shown include unfiled device data.    Electronically Signed By: PEGGY Ibarra CRNA  November 4, 2024  1:29 PM

## 2024-11-05 ENCOUNTER — ALLIED HEALTH/NURSE VISIT (OUTPATIENT)
Dept: FAMILY MEDICINE | Facility: CLINIC | Age: 64
End: 2024-11-05
Payer: COMMERCIAL

## 2024-11-05 DIAGNOSIS — Z90.79 S/P TURP (STATUS POST TRANSURETHRAL RESECTION OF PROSTATE): Primary | ICD-10-CM

## 2024-11-05 NOTE — PROGRESS NOTES
Patient seen in clinic per the orders of Dr. Cervantes following a TURP on 11/4.    TOV:  Patient arrives for catheter removal. 400cc sterile water instilled into the bladder through existing hodge catheter. Catheter removed without problem. Patient urinated 5cc water into urinal.     Bladder scan revealed 339mL.     Spoke to Dr. Cervantes. Verbal orders to replace catheter.       Catheter successfully inserted into the urethral meatus in the usual sterile fashion without immediate complication.  Type of catheter placed: 22 Wolof indwelling catheter  Urine is red/clear in color.  400 cc's of urine output returned.  Balloon was filled with 15cc's of normal saline.  Securement device placed for the catheter.  The patient tolerated the procedure and was instructed to follow up with their PCP or consultant as planned, monitor for catheter dysfunction, monitor for pain or discomfort, return or call for pain, fever, leakage or decreased urine flow, and watch for signs of infection    Dr. Cervantes wanted patient to remove catheter himself at home on Sunday evening and follow-up in clinic Monday. Patient does not feel comfortable removing catheter himself. Patient was scheduled for 11/11 with Dr. Cervantes.     Rosy Law RN on 11/5/2024 at 12:04 PM

## 2024-11-11 ENCOUNTER — OFFICE VISIT (OUTPATIENT)
Dept: UROLOGY | Facility: CLINIC | Age: 64
End: 2024-11-11
Payer: COMMERCIAL

## 2024-11-11 VITALS
WEIGHT: 161 LBS | HEIGHT: 69 IN | DIASTOLIC BLOOD PRESSURE: 80 MMHG | BODY MASS INDEX: 23.85 KG/M2 | SYSTOLIC BLOOD PRESSURE: 168 MMHG | TEMPERATURE: 98.5 F

## 2024-11-11 DIAGNOSIS — R33.8 BENIGN PROSTATIC HYPERPLASIA WITH URINARY RETENTION: Primary | ICD-10-CM

## 2024-11-11 DIAGNOSIS — N40.1 BENIGN PROSTATIC HYPERPLASIA WITH URINARY RETENTION: Primary | ICD-10-CM

## 2024-11-11 DIAGNOSIS — I10 ESSENTIAL HYPERTENSION: ICD-10-CM

## 2024-11-11 PROCEDURE — 99024 POSTOP FOLLOW-UP VISIT: CPT | Performed by: UROLOGY

## 2024-11-11 ASSESSMENT — PAIN SCALES - GENERAL: PAINLEVEL_OUTOF10: NO PAIN (0)

## 2024-11-11 NOTE — PROGRESS NOTES
"Chief Complaint   Patient presents with    Follow Up       Iftikhar DOVE is a 64 year old male who presents today for follow up of   Chief Complaint   Patient presents with    Follow Up    Patient is post TURP for retention of urine.    Current Outpatient Medications   Medication Sig Dispense Refill    atenolol (TENORMIN) 50 MG tablet Take 1 tablet (50 mg) by mouth daily Please stop the chlorthalidone 90 tablet 1    HYDROcodone-acetaminophen (NORCO) 5-325 MG tablet Take 1-2 tablets by mouth every 4 hours as needed for moderate to severe pain. 10 tablet 0    meloxicam (MOBIC) 15 MG tablet Take 1 tablet (15 mg) by mouth daily 90 tablet 1    methocarbamol (ROBAXIN) 500 MG tablet Take 1 tablet (500 mg) by mouth 2 times daily as needed for muscle spasms 60 tablet 2    vitamin D3 (CHOLECALCIFEROL) 50 mcg (2000 units) tablet Take 1 tablet (50 mcg) by mouth daily 100 tablet 3     Allergies   Allergen Reactions    Pcn [Penicillins] Unknown     Happened as child, states \"was in the hospital\"      Past Medical History:   Diagnosis Date    Alcohol dependence (H) 2021    Hypertension      Past Surgical History:   Procedure Laterality Date    LASER KTP TRANSURETHRAL RESECTION (TUR) PROSTATE N/A 2024    Procedure: TRANSURETHRAL RESECTION (TUR) PROSTATE, USING KTP LASER;  Surgeon: Denny Cervantes MD;  Location: [facility]    NO HISTORY OF SURGERY       Family History   Problem Relation Age of Onset    Cancer Mother         colon -  75    Cancer Father         brain tumor    Cancer Sister         female cancer    Unknown/Adopted Sister     Unknown/Adopted Sister     Unknown/Adopted Sister     Unknown/Adopted Maternal Grandmother     Unknown/Adopted Maternal Grandfather     Unknown/Adopted Paternal Grandmother     Unknown/Adopted Paternal Grandfather     No Known Problems Son     No Known Problems Son      Social History     Socioeconomic History    Marital status: Single     Spouse name: None    Number of " children: None    Years of education: None    Highest education level: None   Tobacco Use    Smoking status: Every Day     Current packs/day: 0.50     Average packs/day: 0.5 packs/day for 50.0 years (25.0 ttl pk-yrs)     Types: Cigarettes    Smokeless tobacco: Never   Vaping Use    Vaping status: Never Used   Substance and Sexual Activity    Alcohol use: Yes     Alcohol/week: 35.0 standard drinks of alcohol     Types: 42 Cans of beer per week     Comment: used  heavily - 3 beers/drink once a week since 1/19/2019    Drug use: Yes     Types: Marijuana     Comment: daily use    Sexual activity: Not Currently     Partners: Female     Social Drivers of Health     Financial Resource Strain: Low Risk  (10/6/2023)    Financial Resource Strain     Within the past 12 months, have you or your family members you live with been unable to get utilities (heat, electricity) when it was really needed?: No   Food Insecurity: High Risk (10/6/2023)    Food Insecurity     Within the past 12 months, did you worry that your food would run out before you got money to buy more?: Yes     Within the past 12 months, did the food you bought just not last and you didn t have money to get more?: Yes   Transportation Needs: High Risk (10/6/2023)    Transportation Needs     Within the past 12 months, has lack of transportation kept you from medical appointments, getting your medicines, non-medical meetings or appointments, work, or from getting things that you need?: Yes   Interpersonal Safety: Low Risk  (11/4/2024)    Interpersonal Safety     Do you feel physically and emotionally safe where you currently live?: Yes     Within the past 12 months, have you been hit, slapped, kicked or otherwise physically hurt by someone?: No     Within the past 12 months, have you been humiliated or emotionally abused in other ways by your partner or ex-partner?: No   Housing Stability: High Risk (10/6/2023)    Housing Stability     Do you have housing? : Yes      "Are you worried about losing your housing?: Yes       REVIEW OF SYSTEMS  =================  C: NEGATIVE for fever, chills, change in weight  I: NEGATIVE for worrisome rashes, moles or lesions  E/M: NEGATIVE for ear, mouth and throat problems  R: NEGATIVE for significant cough or SHORTNESS OF BREATH  CV:  NEGATIVE for chest pain, palpitations or peripheral edema  GI: NEGATIVE for nausea, abdominal pain, heartburn, or change in bowel habits  NEURO: NEGATIVE numbness/weakness  : see HPI  PSYCH: NEGATIVE depression/anxiety  LYmph: no new enlarged lymph nodes  Ortho: no new trauma/movements    Physical Exam:  Blood pressure (!) 168/80, temperature 98.5  F (36.9  C), temperature source Temporal, height 1.753 m (5' 9.02\"), weight 73 kg (161 lb).    GENERAL: healthy, alert and no distress  EYES: Eyes grossly normal to inspection, conjunctivae and sclerae normal  RESP: no audible wheeze, cough, or visible cyanosis.  No visible retractions or increased work of breathing.  Able to speak fully in complete sentences.  NEURO: Cranial nerves grossly intact, mentation intact and speech normal  PSYCH: mentation appears normal, affect normal/bright, judgement and insight intact, normal speech and appearance well-groomed    Assessment/Plan:   (N40.1,  R33.8) Benign prostatic hyperplasia with urinary retention  (primary encounter diagnosis)  Comment: trial of void performed today.   Plan: rtc tomorrow for bladder scan    (I10) Essential hypertension  Comment:    Plan: Edward to follow up with Primary Care provider regarding elevated blood pressure.             Please note: Voice recognition software was used in this dictation.  It may therefore contain typographical errors.           "

## 2024-11-11 NOTE — PROGRESS NOTES
Patient arrives for catheter removal. 240 ml water instilled into the bladder through existing hdoge catheter. Catheter removed without problem. Patient urinated 0 cc water into urinal. Patient will call or return to clinic or urgent care if unable to void. Is following up in clinic tomorrow for a nurse visit to get a PVR done.    Samia Bagley MA on 11/11/2024 at 12:27 PM

## 2024-12-24 DIAGNOSIS — I10 ESSENTIAL HYPERTENSION: ICD-10-CM

## 2024-12-26 RX ORDER — ATENOLOL 50 MG/1
TABLET ORAL
Qty: 90 TABLET | Refills: 1 | Status: SHIPPED | OUTPATIENT
Start: 2024-12-26

## 2024-12-27 ENCOUNTER — TELEPHONE (OUTPATIENT)
Dept: FAMILY MEDICINE | Facility: CLINIC | Age: 64
End: 2024-12-27

## 2024-12-27 DIAGNOSIS — R33.8 BENIGN PROSTATIC HYPERPLASIA WITH URINARY RETENTION: ICD-10-CM

## 2024-12-27 DIAGNOSIS — N40.1 BENIGN PROSTATIC HYPERPLASIA WITH URINARY RETENTION: ICD-10-CM

## 2024-12-27 NOTE — TELEPHONE ENCOUNTER
Patient Quality Outreach    Patient is due for the following:   Colon Cancer Screening  Physical Annual Wellness Visit      Topic Date Due    Pneumococcal Vaccine (1 of 2 - PCV) Never done    Zoster (Shingles) Vaccine (1 of 2) Never done    Flu Vaccine (1) Never done    COVID-19 Vaccine (3 - 2024-25 season) 09/01/2024       Action(s) Taken:   Schedule a Annual Wellness Visit    Type of outreach:    Letter sent    Questions for provider review:    None           Jeanie Estevez MA

## 2024-12-27 NOTE — LETTER
December 27, 2024    To  Iftikhar DOVE  [address] [address]    Your team at [facility] cares about your health. We have reviewed your chart and based on our findings; we are making the following recommendations to better manage your health.     You are in particular need of attention regarding the following:     PREVENTATIVE VISIT: Annual Medicare Wellness:Schedule an Annual Medicare Wellness Exam. Please call your [facility] clinic to set up your appointment.    If you have already completed these items, please contact the clinic via phone or   MyChart so your care team can review and update your records. Thank you for   choosing [facility]ics for your healthcare needs. For any questions,   concerns, or to schedule an appointment please contact our clinic.    Healthy Regards,      Your [facility] Care Team            Electronically signed

## 2025-01-01 RX ORDER — HYDROCODONE BITARTRATE AND ACETAMINOPHEN 5; 325 MG/1; MG/1
1-2 TABLET ORAL EVERY 4 HOURS PRN
Qty: 10 TABLET | Refills: 0 | OUTPATIENT
Start: 2025-01-01

## 2025-01-02 NOTE — TELEPHONE ENCOUNTER
Writer spoke with patient's niece (C2C on file) regarding below.  Niece stated that the refill request was made in error and they do not need any thing at this time.    Dana Contreras RN on 1/2/2025 at 1:41 PM

## 2025-04-01 DIAGNOSIS — G89.29 CHRONIC PAIN OF RIGHT KNEE: ICD-10-CM

## 2025-04-01 DIAGNOSIS — M25.551 HIP PAIN, RIGHT: ICD-10-CM

## 2025-04-01 DIAGNOSIS — M25.561 CHRONIC PAIN OF RIGHT KNEE: ICD-10-CM

## 2025-04-01 NOTE — LETTER
April 9, 2025      Iftikhar DOVE  [address] [facility] [address]        Dear Iftikhar,       We are concerned about your health care.  We recently provided you with a medication refill.  Many medications require routine follow-up with your Doctor.      At this time we ask that: You schedule an appointment for your annual physical. Call the clinic at [phone] to schedule.     Your prescription:  No further refills will be given until your follow up care is completed.      Thank you,      [facility] Team

## 2025-04-02 RX ORDER — METHOCARBAMOL 500 MG/1
500 TABLET, FILM COATED ORAL 2 TIMES DAILY PRN
Qty: 60 TABLET | Refills: 0 | Status: SHIPPED | OUTPATIENT
Start: 2025-04-02

## 2025-04-03 NOTE — TELEPHONE ENCOUNTER
1 month supply refilled, please follow-up before med run out.  May schedule him on April 14 Monday in the morning for physical and general follow-up if it works well for him.

## 2025-04-09 NOTE — TELEPHONE ENCOUNTER
LVM to have patient call back, sending letter.   Attempted contacting Niece, C2C confirmed, LVM to have pt call back.     Meghan KAT, VF

## 2025-05-22 ENCOUNTER — OFFICE VISIT (OUTPATIENT)
Dept: FAMILY MEDICINE | Facility: CLINIC | Age: 65
End: 2025-05-22
Payer: COMMERCIAL

## 2025-05-22 VITALS
OXYGEN SATURATION: 98 % | DIASTOLIC BLOOD PRESSURE: 83 MMHG | TEMPERATURE: 98.3 F | BODY MASS INDEX: 22.31 KG/M2 | RESPIRATION RATE: 15 BRPM | HEIGHT: 69 IN | WEIGHT: 150.6 LBS | HEART RATE: 67 BPM | SYSTOLIC BLOOD PRESSURE: 144 MMHG

## 2025-05-22 DIAGNOSIS — F17.200 TOBACCO USE DISORDER: ICD-10-CM

## 2025-05-22 DIAGNOSIS — M54.50 CHRONIC BILATERAL LOW BACK PAIN WITHOUT SCIATICA: ICD-10-CM

## 2025-05-22 DIAGNOSIS — F10.21 ALCOHOL DEPENDENCE IN REMISSION (H): ICD-10-CM

## 2025-05-22 DIAGNOSIS — Z90.79 S/P TURP (STATUS POST TRANSURETHRAL RESECTION OF PROSTATE): ICD-10-CM

## 2025-05-22 DIAGNOSIS — G89.29 CHRONIC PAIN OF RIGHT KNEE: ICD-10-CM

## 2025-05-22 DIAGNOSIS — M25.561 CHRONIC PAIN OF RIGHT KNEE: ICD-10-CM

## 2025-05-22 DIAGNOSIS — N40.1 BENIGN PROSTATIC HYPERPLASIA WITH URINARY RETENTION: ICD-10-CM

## 2025-05-22 DIAGNOSIS — Z13.220 LIPID SCREENING: ICD-10-CM

## 2025-05-22 DIAGNOSIS — G89.29 CHRONIC BILATERAL LOW BACK PAIN WITHOUT SCIATICA: ICD-10-CM

## 2025-05-22 DIAGNOSIS — R33.8 BENIGN PROSTATIC HYPERPLASIA WITH URINARY RETENTION: ICD-10-CM

## 2025-05-22 DIAGNOSIS — I10 ESSENTIAL HYPERTENSION: Primary | ICD-10-CM

## 2025-05-22 PROBLEM — F10.20 ALCOHOL DEPENDENCE (H): Status: RESOLVED | Noted: 2024-10-14 | Resolved: 2025-05-22

## 2025-05-22 LAB
ALBUMIN SERPL BCG-MCNC: 4.7 G/DL (ref 3.5–5.2)
ALP SERPL-CCNC: 86 U/L (ref 40–150)
ALT SERPL W P-5'-P-CCNC: 12 U/L (ref 0–70)
ANION GAP SERPL CALCULATED.3IONS-SCNC: 10 MMOL/L (ref 7–15)
AST SERPL W P-5'-P-CCNC: 14 U/L (ref 0–45)
BILIRUB SERPL-MCNC: 0.6 MG/DL
BUN SERPL-MCNC: 11.4 MG/DL (ref 8–23)
CALCIUM SERPL-MCNC: 9.9 MG/DL (ref 8.8–10.4)
CHLORIDE SERPL-SCNC: 101 MMOL/L (ref 98–107)
CHOLEST SERPL-MCNC: 188 MG/DL
CREAT SERPL-MCNC: 0.9 MG/DL (ref 0.67–1.17)
EGFRCR SERPLBLD CKD-EPI 2021: >90 ML/MIN/1.73M2
FASTING STATUS PATIENT QL REPORTED: YES
FASTING STATUS PATIENT QL REPORTED: YES
GLUCOSE SERPL-MCNC: 105 MG/DL (ref 70–99)
HCO3 SERPL-SCNC: 29 MMOL/L (ref 22–29)
HDLC SERPL-MCNC: 55 MG/DL
LDLC SERPL CALC-MCNC: 108 MG/DL
NONHDLC SERPL-MCNC: 133 MG/DL
POTASSIUM SERPL-SCNC: 4.6 MMOL/L (ref 3.4–5.3)
PROT SERPL-MCNC: 7.6 G/DL (ref 6.4–8.3)
SODIUM SERPL-SCNC: 140 MMOL/L (ref 135–145)
TRIGL SERPL-MCNC: 126 MG/DL

## 2025-05-22 RX ORDER — METHOCARBAMOL 500 MG/1
500 TABLET, FILM COATED ORAL 2 TIMES DAILY PRN
Qty: 60 TABLET | Refills: 0 | Status: SHIPPED | OUTPATIENT
Start: 2025-05-22

## 2025-05-22 RX ORDER — MELOXICAM 15 MG/1
15 TABLET ORAL DAILY
Qty: 90 TABLET | Refills: 1 | Status: SHIPPED | OUTPATIENT
Start: 2025-05-22

## 2025-05-22 RX ORDER — ATENOLOL 50 MG/1
75 TABLET ORAL DAILY
Qty: 135 TABLET | Refills: 3 | Status: SHIPPED | OUTPATIENT
Start: 2025-05-22

## 2025-05-22 RX ORDER — TAMSULOSIN HYDROCHLORIDE 0.4 MG/1
0.4 CAPSULE ORAL DAILY
Qty: 90 CAPSULE | Refills: 3 | Status: SHIPPED | OUTPATIENT
Start: 2025-05-22

## 2025-05-22 ASSESSMENT — PAIN SCALES - GENERAL: PAINLEVEL_OUTOF10: SEVERE PAIN (7)

## 2025-05-22 NOTE — PROGRESS NOTES
Assessment & Plan   Problem List Items Addressed This Visit          Nervous and Auditory    Chronic bilateral low back pain without sciatica    Relevant Medications    meloxicam (MOBIC) 15 MG tablet    methocarbamol (ROBAXIN) 500 MG tablet       Circulatory    Essential hypertension - Primary    Relevant Medications    atenolol (TENORMIN) 50 MG tablet    Other Relevant Orders    Comprehensive metabolic panel (BMP + Alb, Alk Phos, ALT, AST, Total. Bili, TP)    Home Blood Pressure Monitor Order for DME - ONLY FOR DME       Behavioral    Tobacco use disorder    RESOLVED: Alcohol dependence in remission (H)     Other Visit Diagnoses         Chronic pain of right knee        Relevant Medications    meloxicam (MOBIC) 15 MG tablet    methocarbamol (ROBAXIN) 500 MG tablet      S/P TURP (status post transurethral resection of prostate)          Benign prostatic hyperplasia with urinary retention        Relevant Medications    tamsulosin (FLOMAX) 0.4 MG capsule      Lipid screening        Relevant Orders    Lipid panel reflex to direct LDL Fasting           Blood pressure has remained high has still some urinary hesitancy post TURP.  He would like to restart Flomax as this was helpful in the past but I did instruct him that this would not likely be helpful given his recent procedure.  Overall urinating pretty well and no concern for infection.  Blood pressure remains elevated.  Will increase atenolol to 75 mg daily and follow-up for his annual physical.  Upon review wanting to avoid hypotension with risk of falls.  He does use cane and walker now and has significantly cut back his alcohol use to rare episodes and no longer using daily.  His back has been stable.  He would like a refill of meloxicam and Robaxin which were helpful as needed.  Potential side effects reviewed. Repeat BMP and lipids today with cardiovascular risk discussed.    The longitudinal plan of care for the diagnosis(es)/condition(s) as documented were  addressed during this visit. Due to the added complexity in care, I will continue to support Iftikhar in the subsequent management and with ongoing continuity of care.       Nicotine/Tobacco Cessation  He reports that he has been smoking cigarettes. He has a 16.5 pack-year smoking history. He has never used smokeless tobacco.  Nicotine/Tobacco Cessation Plan  Information offered: Patient not interested at this time        Subjective   Iftikhar is a 65 year old, presenting for the following health issues:  Recheck Medication      5/22/2025     7:43 AM   Additional Questions   Roomed by Madelyn         5/22/2025     7:43 AM   Patient Reported Additional Medications   Patient reports taking the following new medications none     History of Present Illness       Back Pain:  He presents for follow up of back pain. Patient's back pain is a chronic problem.  Location of back pain:  Right lower back  Description of back pain: dull ache and gnawing  Back pain spreads: right buttocks, right thigh, right knee and right foot    Since patient first noticed back pain, pain is: gradually worsening  Does back pain interfere with his job:  Not applicable       Hypertension: He presents for follow up of hypertension.  He does not check blood pressure  regularly outside of the clinic. Outside blood pressures have been over 140/90. He follows a low salt diet.     He eats 0-1 servings of fruits and vegetables daily.He consumes 0 sweetened beverage(s) daily.He exercises with enough effort to increase his heart rate 9 or less minutes per day.  He exercises with enough effort to increase his heart rate 3 or less days per week.   He is taking medications regularly.        patient following up on medication has been taking atenolol but he missed dose this morning.  Notes blood pressure persistently elevated in the past but not checking consistently at home.  No chest pain or breathing issues.  He continues to smoke but has cut this back.  No  "longer drinking but reports having drink intermittently. No falls but does use walker and cane. TURP last fall for urinary retention. Still hesitancy and we stream but things much better.       Review of Systems  Constitutional, HEENT, cardiovascular, pulmonary, GI, , musculoskeletal, neuro, skin, endocrine and psych systems are negative, except as otherwise noted.      Objective    BP (!) 163/93 (BP Location: Left arm, Patient Position: Sitting, Cuff Size: Adult Regular)   Pulse 67   Temp 98.3  F (36.8  C) (Temporal)   Resp 15   Ht 1.753 m (5' 9.02\")   Wt 68.3 kg (150 lb 9.6 oz)   SpO2 98%   BMI 22.23 kg/m    Body mass index is 22.23 kg/m .  Physical Exam   GENERAL: alert and no distress  EYES: Eyes grossly normal to inspection, PERRL and conjunctivae and sclerae normal  HENT: nose and mouth without ulcers or lesions  RESP: lungs clear to auscultation - no rales, rhonchi or wheezes  CV: regular rate and rhythm, normal S1 S2, no peripheral edema  MS: no gross musculoskeletal defects noted, no edema, No joint line tenderness at the knee, no IT band tenderness, no laxity of knee with valgus or varus strain, negative drawer   SKIN: no suspicious lesions or rashes  NEURO: Normal strength and tone without focal deficit, mentation intact and speech normal  PSYCH: mentation appears normal, affect normal/bright          Signed Electronically by: Aidan Roy MD    "

## 2025-06-03 ENCOUNTER — TELEPHONE (OUTPATIENT)
Dept: FAMILY MEDICINE | Facility: CLINIC | Age: 65
End: 2025-06-03
Payer: COMMERCIAL

## 2025-06-03 NOTE — TELEPHONE ENCOUNTER
Patient Quality Outreach    Patient is due for the following:   Colon Cancer Screening  Physical Annual Wellness Visit      Topic Date Due    Pneumococcal Vaccine (1 of 2 - PCV) Never done    Zoster (Shingles) Vaccine (1 of 2) Never done    COVID-19 Vaccine (3 - 2024-25 season) 09/01/2024       Action(s) Taken:   Patient has upcoming appointment, these items will be addressed at that time.    Type of outreach:    Chart review performed, no outreach needed.    Questions for provider review:    None         Jeanie Estevez MA

## 2025-06-04 ENCOUNTER — RESULTS FOLLOW-UP (OUTPATIENT)
Dept: FAMILY MEDICINE | Facility: CLINIC | Age: 65
End: 2025-06-04

## (undated) DEVICE — GOWN XLG DISP 9545

## (undated) DEVICE — SOL NACL 0.9% IRRIG 3000ML BAG 2B7477

## (undated) DEVICE — SUCTION MANIFOLD NEPTUNE 2 SYS 4 PORT 0702-020-000

## (undated) DEVICE — CATH FOLEY 3WAY 22FR 30ML LUBRICATH LATEX 0167L22

## (undated) DEVICE — Device

## (undated) DEVICE — BAG URINARY DRAIN 4000ML LF 153509

## (undated) DEVICE — SYR 30ML LL W/O NDL

## (undated) DEVICE — SOL WATER IRRIG 1000ML BOTTLE 07139-09

## (undated) DEVICE — TUBING SOLUTION SET CONTINU-FLO CLEARLINK 110" 2C8537

## (undated) DEVICE — XPS LASER

## (undated) DEVICE — GLOVE BIOGEL PI ULTRATOUCH G SZ 7.0 42170

## (undated) RX ORDER — FENTANYL CITRATE 50 UG/ML
INJECTION, SOLUTION INTRAMUSCULAR; INTRAVENOUS
Status: DISPENSED
Start: 2024-11-04

## (undated) RX ORDER — PROPOFOL 10 MG/ML
INJECTION, EMULSION INTRAVENOUS
Status: DISPENSED
Start: 2024-11-04

## (undated) RX ORDER — LIDOCAINE HYDROCHLORIDE 10 MG/ML
INJECTION, SOLUTION EPIDURAL; INFILTRATION; INTRACAUDAL; PERINEURAL
Status: DISPENSED
Start: 2024-11-04

## (undated) RX ORDER — EPHEDRINE SULFATE 50 MG/ML
INJECTION, SOLUTION INTRAMUSCULAR; INTRAVENOUS; SUBCUTANEOUS
Status: DISPENSED
Start: 2024-11-04

## (undated) RX ORDER — GLYCOPYRROLATE 0.2 MG/ML
INJECTION, SOLUTION INTRAMUSCULAR; INTRAVENOUS
Status: DISPENSED
Start: 2024-11-04